# Patient Record
Sex: FEMALE | Race: WHITE | NOT HISPANIC OR LATINO | Employment: STUDENT | URBAN - METROPOLITAN AREA
[De-identification: names, ages, dates, MRNs, and addresses within clinical notes are randomized per-mention and may not be internally consistent; named-entity substitution may affect disease eponyms.]

---

## 2023-07-28 ENCOUNTER — OFFICE VISIT (OUTPATIENT)
Dept: OBSTETRICS AND GYNECOLOGY | Facility: CLINIC | Age: 33
End: 2023-07-28
Attending: STUDENT IN AN ORGANIZED HEALTH CARE EDUCATION/TRAINING PROGRAM
Payer: COMMERCIAL

## 2023-07-28 VITALS
WEIGHT: 257.81 LBS | BODY MASS INDEX: 44.01 KG/M2 | DIASTOLIC BLOOD PRESSURE: 76 MMHG | HEIGHT: 64 IN | SYSTOLIC BLOOD PRESSURE: 120 MMHG

## 2023-07-28 DIAGNOSIS — Z11.51 ENCOUNTER FOR SCREENING FOR HUMAN PAPILLOMAVIRUS (HPV): Primary | ICD-10-CM

## 2023-07-28 DIAGNOSIS — Z12.4 SCREENING FOR CERVICAL CANCER: ICD-10-CM

## 2023-07-28 DIAGNOSIS — Z01.419 WELL WOMAN EXAM: ICD-10-CM

## 2023-07-28 PROCEDURE — 99385 PR PREVENTIVE VISIT,NEW,18-39: ICD-10-PCS | Mod: S$GLB,,, | Performed by: STUDENT IN AN ORGANIZED HEALTH CARE EDUCATION/TRAINING PROGRAM

## 2023-07-28 PROCEDURE — 3078F PR MOST RECENT DIASTOLIC BLOOD PRESSURE < 80 MM HG: ICD-10-PCS | Mod: CPTII,S$GLB,, | Performed by: STUDENT IN AN ORGANIZED HEALTH CARE EDUCATION/TRAINING PROGRAM

## 2023-07-28 PROCEDURE — 3008F BODY MASS INDEX DOCD: CPT | Mod: CPTII,S$GLB,, | Performed by: STUDENT IN AN ORGANIZED HEALTH CARE EDUCATION/TRAINING PROGRAM

## 2023-07-28 PROCEDURE — 99999 PR PBB SHADOW E&M-NEW PATIENT-LVL III: ICD-10-PCS | Mod: PBBFAC,,, | Performed by: STUDENT IN AN ORGANIZED HEALTH CARE EDUCATION/TRAINING PROGRAM

## 2023-07-28 PROCEDURE — 99999 PR PBB SHADOW E&M-NEW PATIENT-LVL III: CPT | Mod: PBBFAC,,, | Performed by: STUDENT IN AN ORGANIZED HEALTH CARE EDUCATION/TRAINING PROGRAM

## 2023-07-28 PROCEDURE — 1159F PR MEDICATION LIST DOCUMENTED IN MEDICAL RECORD: ICD-10-PCS | Mod: CPTII,S$GLB,, | Performed by: STUDENT IN AN ORGANIZED HEALTH CARE EDUCATION/TRAINING PROGRAM

## 2023-07-28 PROCEDURE — 1159F MED LIST DOCD IN RCRD: CPT | Mod: CPTII,S$GLB,, | Performed by: STUDENT IN AN ORGANIZED HEALTH CARE EDUCATION/TRAINING PROGRAM

## 2023-07-28 PROCEDURE — 3074F PR MOST RECENT SYSTOLIC BLOOD PRESSURE < 130 MM HG: ICD-10-PCS | Mod: CPTII,S$GLB,, | Performed by: STUDENT IN AN ORGANIZED HEALTH CARE EDUCATION/TRAINING PROGRAM

## 2023-07-28 PROCEDURE — 3008F PR BODY MASS INDEX (BMI) DOCUMENTED: ICD-10-PCS | Mod: CPTII,S$GLB,, | Performed by: STUDENT IN AN ORGANIZED HEALTH CARE EDUCATION/TRAINING PROGRAM

## 2023-07-28 PROCEDURE — 99385 PREV VISIT NEW AGE 18-39: CPT | Mod: S$GLB,,, | Performed by: STUDENT IN AN ORGANIZED HEALTH CARE EDUCATION/TRAINING PROGRAM

## 2023-07-28 PROCEDURE — 87624 HPV HI-RISK TYP POOLED RSLT: CPT | Performed by: STUDENT IN AN ORGANIZED HEALTH CARE EDUCATION/TRAINING PROGRAM

## 2023-07-28 PROCEDURE — 88175 CYTOPATH C/V AUTO FLUID REDO: CPT | Performed by: STUDENT IN AN ORGANIZED HEALTH CARE EDUCATION/TRAINING PROGRAM

## 2023-07-28 PROCEDURE — 3074F SYST BP LT 130 MM HG: CPT | Mod: CPTII,S$GLB,, | Performed by: STUDENT IN AN ORGANIZED HEALTH CARE EDUCATION/TRAINING PROGRAM

## 2023-07-28 PROCEDURE — 3078F DIAST BP <80 MM HG: CPT | Mod: CPTII,S$GLB,, | Performed by: STUDENT IN AN ORGANIZED HEALTH CARE EDUCATION/TRAINING PROGRAM

## 2023-07-28 RX ORDER — DROSPIRENONE AND ETHINYL ESTRADIOL 0.02-3(28)
1 KIT ORAL DAILY
Qty: 30 TABLET | Refills: 11 | Status: SHIPPED | OUTPATIENT
Start: 2023-07-28 | End: 2024-02-12 | Stop reason: ALTCHOICE

## 2023-07-28 RX ORDER — MINOXIDIL 2.5 MG/1
2.5 TABLET ORAL
COMMUNITY
Start: 2023-07-18

## 2023-07-28 RX ORDER — HYDROXYZINE HYDROCHLORIDE 25 MG/1
25 TABLET, FILM COATED ORAL 2 TIMES DAILY
COMMUNITY
Start: 2023-07-17 | End: 2024-02-12

## 2023-07-28 RX ORDER — TRAZODONE HYDROCHLORIDE 50 MG/1
75 TABLET ORAL NIGHTLY PRN
COMMUNITY
Start: 2023-07-17

## 2023-07-28 RX ORDER — ESCITALOPRAM OXALATE 20 MG/1
20 TABLET ORAL
COMMUNITY
Start: 2023-03-22

## 2023-07-28 RX ORDER — BUSPIRONE HYDROCHLORIDE 15 MG/1
15 TABLET ORAL 2 TIMES DAILY
COMMUNITY
Start: 2023-07-17 | End: 2024-02-12

## 2023-07-28 RX ORDER — DROSPIRENONE AND ETHINYL ESTRADIOL 0.03MG-3MG
1 KIT ORAL DAILY
Qty: 30 TABLET | Refills: 11 | Status: CANCELLED | OUTPATIENT
Start: 2023-07-28 | End: 2024-07-27

## 2023-07-28 RX ORDER — METFORMIN HYDROCHLORIDE 500 MG/1
500 TABLET ORAL 2 TIMES DAILY
COMMUNITY
Start: 2023-05-31

## 2023-07-28 RX ORDER — SPIRONOLACTONE 100 MG/1
200 TABLET, FILM COATED ORAL
COMMUNITY
Start: 2023-07-17

## 2023-07-28 NOTE — PROGRESS NOTES
Chief Complaint: Well Woman Exam     HPI:      Roxanna Banuelos is a 32 y.o.  who presents today for well woman exam.  LMP: Patient's last menstrual period was 2023.  Has several concerns.  Hypothyroid and also PCOS.  Did well on compa previously but would have cycle very 1.5 months.  Stopped taking it in May and had bleeding on , , and .  Wants to discuss other options.  No other issues, problems, or complaints. Specifically, patient denies abnormal vaginal bleeding, discharge, pelvic pain, urinary problems, or changes in appetite. Ms. Banuelos is not currently sexually active.     Previous Pap: 2023   OB History          0    Para   0    Term   0       0    AB   0    Living   0         SAB   0    IAB   0    Ectopic   0    Multiple   0    Live Births   0               Past Medical History:   Diagnosis Date    Hypothyroid     Oligomenorrhea 01/15/2013     Past Surgical History:   Procedure Laterality Date    REDUCTION OF BOTH BREASTS      SCALP LESION REMOVAL W/ FLAP AND SKIN GRAFT      TONSILLECTOMY, ADENOIDECTOMY       Social History     Socioeconomic History    Marital status: Single   Tobacco Use    Smoking status: Never   Substance and Sexual Activity    Alcohol use: Never     Comment: social    Drug use: Never    Sexual activity: Never   Social History Narrative    Senior at [employer].     Family History   Problem Relation Age of Onset    Breast cancer Paternal Grandmother     Breast cancer Maternal Grandmother     Colon cancer Neg Hx     Ovarian cancer Neg Hx     Diabetes Neg Hx        Current Outpatient Medications:     busPIRone (BUSPAR) 15 MG tablet, Take 15 mg by mouth 2 (two) times daily., Disp: , Rfl:     EScitalopram oxalate (LEXAPRO) 20 MG tablet, Take 20 mg by mouth., Disp: , Rfl:     hydrOXYzine HCL (ATARAX) 25 MG tablet, Take 25 mg by mouth 2 (two) times daily., Disp: , Rfl:     metFORMIN (GLUCOPHAGE) 500 MG tablet, Take 500 mg by mouth 2 (two) times daily., Disp:  ", Rfl:     minoxidiL (LONITEN) 2.5 MG tablet, Take 2.5 mg by mouth., Disp: , Rfl:     multivitamin capsule, Take 1 capsule by mouth once daily., Disp: , Rfl:     spironolactone (ALDACTONE) 100 MG tablet, Take 200 mg by mouth., Disp: , Rfl:     TIROSINT 50 mcg Cap, TAKE ONE Capsule BY MOUTH EVERY DAY, Disp: 90 capsule, Rfl: 0    traZODone (DESYREL) 50 MG tablet, Take 75 mg by mouth nightly as needed., Disp: , Rfl:     drospirenone-ethinyl estradioL (MEAGHAN) 3-0.02 mg per tablet, Take 1 tablet by mouth once daily., Disp: 30 tablet, Rfl: 11    ROS:     GENERAL: Denies unintentional weight gain or weight loss. Feeling well overall.   SKIN: Denies rash or lesions.   HEENT: Denies headaches, or vision changes.   CARDIOVASCULAR: Denies palpitations or chest pain.   RESPIRATORY: Denies shortness of breath or dyspnea on exertion.  BREASTS: Denies pain, lumps, or nipple discharge.   ABDOMEN: Denies abdominal pain, constipation, diarrhea, nausea, vomiting, change in appetite.  URINARY: Denies frequency, dysuria, hematuria.  NEUROLOGIC: Denies syncope or weakness.   PSYCHIATRIC: Denies depression, anxiety or mood swings.    Physical Exam:      PHYSICAL EXAM:  /76   Ht 5' 4" (1.626 m)   Wt 117 kg (257 lb 13.3 oz)   LMP 07/05/2023   BMI 44.26 kg/m²   Body mass index is 44.26 kg/m².     APPEARANCE: Well nourished, well developed, in no acute distress.  PSYCH: Appropriate mood and affect.  SKIN: No acne or hirsutism.  NECK: Neck symmetric without masses or thyromegaly.  NODES: No inguinal, axillary, or supraclavicular lymph node enlargement.  BREASTS: Symmetrical, no skin changes or visible lesions.  No palpable masses or nipple discharge bilaterally.  ABDOMEN: Soft.  No tenderness or masses.    PELVIC: Normal external genitalia without lesions.  Normal hair distribution.  Adequate perineal body, normal urethral meatus.  Vagina moist and well rugated without lesions or discharge.  Cervix pink, without lesions, discharge or " tenderness.  No significant cystocele or rectocele.  Bimanual exam shows uterus to be normal size, regular, mobile and nontender.  Adnexa without masses or tenderness.    EXTREMITIES: No edema.  No tenderness to palpation.    Assessment/Plan:     32 y.o.     Encounter for screening for human papillomavirus (HPV)  -     HPV High Risk Genotypes, PCR    Screening for cervical cancer  -     Liquid-Based Pap Smear, Screening    Well woman exam    Other orders  -     drospirenone-ethinyl estradioL (MEAGHAN) 3-0.02 mg per tablet; Take 1 tablet by mouth once daily.  Dispense: 30 tablet; Refill: 11          Counselin.  Annual exam performed today without difficulty.  Patient was counseled today on current ASCCP pap guidelines, the recommendation for yearly pelvic exams, healthy diet and exercise routines, breast self awareness.  Pap smear collected.  All questions answered.    2.  Contraception:  Desires to continue ocps.  Reviewed pcos diagnosis and treatment options.  Will try meaghan.  Discussed increasing dose back to compa if she has break through bleeding.  She is in agreement with plan.  R/B/A reviewed including but not limited to risk of cramping, irregular bleeding, nausea, bloating, breast tenderness, headache, stroke, blood clot, heart attack.  Patient voiced understanding and desires to proceed with treatment.  3.  Follow up with PCP for other health maintenance.  4.  Follow up in about 1 year (around 2024).      Use of the [facility] Patient Portal discussed and encouraged during today's visit.

## 2023-08-02 LAB
HPV HR 12 DNA SPEC QL NAA+PROBE: NEGATIVE
HPV16 AG SPEC QL: NEGATIVE
HPV18 DNA SPEC QL NAA+PROBE: NEGATIVE

## 2023-08-03 LAB
FINAL PATHOLOGIC DIAGNOSIS: NORMAL
Lab: NORMAL

## 2023-09-01 ENCOUNTER — TELEPHONE (OUTPATIENT)
Dept: OBSTETRICS AND GYNECOLOGY | Facility: CLINIC | Age: 33
End: 2023-09-01
Payer: COMMERCIAL

## 2023-09-01 DIAGNOSIS — Z12.31 ENCOUNTER FOR SCREENING MAMMOGRAM FOR BREAST CANCER: Primary | ICD-10-CM

## 2023-09-08 ENCOUNTER — APPOINTMENT (OUTPATIENT)
Dept: RADIOLOGY | Facility: OTHER | Age: 33
End: 2023-09-08
Attending: STUDENT IN AN ORGANIZED HEALTH CARE EDUCATION/TRAINING PROGRAM
Payer: COMMERCIAL

## 2023-09-08 VITALS — HEIGHT: 64 IN | WEIGHT: 257.94 LBS | BODY MASS INDEX: 44.04 KG/M2

## 2023-09-08 DIAGNOSIS — Z12.31 ENCOUNTER FOR SCREENING MAMMOGRAM FOR BREAST CANCER: ICD-10-CM

## 2023-09-08 PROCEDURE — 77063 MAMMO DIGITAL SCREENING BILAT WITH TOMO: ICD-10-PCS | Mod: 26,,, | Performed by: RADIOLOGY

## 2023-09-08 PROCEDURE — 77067 MAMMO DIGITAL SCREENING BILAT WITH TOMO: ICD-10-PCS | Mod: 26,,, | Performed by: RADIOLOGY

## 2023-09-08 PROCEDURE — 77063 BREAST TOMOSYNTHESIS BI: CPT | Mod: 26,,, | Performed by: RADIOLOGY

## 2023-09-08 PROCEDURE — 77067 SCR MAMMO BI INCL CAD: CPT | Mod: TC,PN

## 2023-09-08 PROCEDURE — 77067 SCR MAMMO BI INCL CAD: CPT | Mod: 26,,, | Performed by: RADIOLOGY

## 2023-09-12 ENCOUNTER — TELEPHONE (OUTPATIENT)
Dept: OBSTETRICS AND GYNECOLOGY | Facility: CLINIC | Age: 33
End: 2023-09-12
Payer: COMMERCIAL

## 2023-09-12 DIAGNOSIS — Z91.89 AT HIGH RISK FOR BREAST CANCER: Primary | ICD-10-CM

## 2023-09-12 NOTE — TELEPHONE ENCOUNTER
Spoke with patient and all questions answered.  Will set up for breast clinic.  She is in agreement with plan.

## 2023-09-29 ENCOUNTER — OFFICE VISIT (OUTPATIENT)
Dept: HEMATOLOGY/ONCOLOGY | Facility: CLINIC | Age: 33
End: 2023-09-29
Attending: STUDENT IN AN ORGANIZED HEALTH CARE EDUCATION/TRAINING PROGRAM
Payer: COMMERCIAL

## 2023-09-29 ENCOUNTER — PATIENT MESSAGE (OUTPATIENT)
Dept: HEMATOLOGY/ONCOLOGY | Facility: CLINIC | Age: 33
End: 2023-09-29
Payer: COMMERCIAL

## 2023-09-29 VITALS
BODY MASS INDEX: 41.85 KG/M2 | HEIGHT: 64 IN | RESPIRATION RATE: 18 BRPM | SYSTOLIC BLOOD PRESSURE: 128 MMHG | WEIGHT: 245.13 LBS | TEMPERATURE: 98 F | OXYGEN SATURATION: 98 % | DIASTOLIC BLOOD PRESSURE: 79 MMHG | HEART RATE: 73 BPM

## 2023-09-29 DIAGNOSIS — Z91.89 AT HIGH RISK FOR BREAST CANCER: Primary | ICD-10-CM

## 2023-09-29 DIAGNOSIS — Z80.3 FAMILY HISTORY OF BREAST CANCER: ICD-10-CM

## 2023-09-29 PROCEDURE — 1159F PR MEDICATION LIST DOCUMENTED IN MEDICAL RECORD: ICD-10-PCS | Mod: CPTII,S$GLB,, | Performed by: NURSE PRACTITIONER

## 2023-09-29 PROCEDURE — 3078F PR MOST RECENT DIASTOLIC BLOOD PRESSURE < 80 MM HG: ICD-10-PCS | Mod: CPTII,S$GLB,, | Performed by: NURSE PRACTITIONER

## 2023-09-29 PROCEDURE — 3008F BODY MASS INDEX DOCD: CPT | Mod: CPTII,S$GLB,, | Performed by: NURSE PRACTITIONER

## 2023-09-29 PROCEDURE — 99999 PR PBB SHADOW E&M-EST. PATIENT-LVL V: ICD-10-PCS | Mod: PBBFAC,,, | Performed by: NURSE PRACTITIONER

## 2023-09-29 PROCEDURE — 99204 PR OFFICE/OUTPT VISIT, NEW, LEVL IV, 45-59 MIN: ICD-10-PCS | Mod: S$GLB,,, | Performed by: NURSE PRACTITIONER

## 2023-09-29 PROCEDURE — 99204 OFFICE O/P NEW MOD 45 MIN: CPT | Mod: S$GLB,,, | Performed by: NURSE PRACTITIONER

## 2023-09-29 PROCEDURE — 3008F PR BODY MASS INDEX (BMI) DOCUMENTED: ICD-10-PCS | Mod: CPTII,S$GLB,, | Performed by: NURSE PRACTITIONER

## 2023-09-29 PROCEDURE — 99999 PR PBB SHADOW E&M-EST. PATIENT-LVL V: CPT | Mod: PBBFAC,,, | Performed by: NURSE PRACTITIONER

## 2023-09-29 PROCEDURE — 3074F PR MOST RECENT SYSTOLIC BLOOD PRESSURE < 130 MM HG: ICD-10-PCS | Mod: CPTII,S$GLB,, | Performed by: NURSE PRACTITIONER

## 2023-09-29 PROCEDURE — 3074F SYST BP LT 130 MM HG: CPT | Mod: CPTII,S$GLB,, | Performed by: NURSE PRACTITIONER

## 2023-09-29 PROCEDURE — 1159F MED LIST DOCD IN RCRD: CPT | Mod: CPTII,S$GLB,, | Performed by: NURSE PRACTITIONER

## 2023-09-29 PROCEDURE — 3078F DIAST BP <80 MM HG: CPT | Mod: CPTII,S$GLB,, | Performed by: NURSE PRACTITIONER

## 2023-09-29 NOTE — PROGRESS NOTES
"  Reason For Consultation:   Increased lifetime risk of breast cancer    Referring Provider:   Jessica Huddleston MD  [address]  [address]  [place] [address]    Records Obtained: Records of the patients history including those obtained from the referring provider were reviewed and summarized in detail.    HPI:   Roxanna Banuelos presents for consultation of increased risk of breast cancer. She is premenopausal. She presented for screening mammogram on 23 which was benign but revealed a Tyrer-Cuzick score of 23.95%.     Today, Feels good and no complaints.   No breast concerns.    High Risk Breast cancer specific history:  - Age: 32 y.o.   - Height:  5'4"  - Weight: 245 lbs  - Breast density per BI-RADS: almost entirely fatty   - Age at menarche:  11  - Number of pregnancies: ; age of first live birth: n/a   - History of breast feeding: No.   - Age at menopause, if applicable:  n/a.  -Uterus and ovaries intact: Yes  - HRT: No, does take oral contraceptives      - Genetic testing: No  - Personal history of cancer: No  - Previous chest radiation exposure between ages 10-30 years old: No  - Personal history of breast biopsy: No  - [employer]ken[employer] Inheritance: No  - Family history of cancer:  paternal grandmother: breast cancer at 48,  at 52, both breasts  Maternal grandmother: breast cancer at 83, now 84, one breast, brca negative  Maternal grandmother's sister: breast cancer  Maternal grandmother's sister's grandchild: breast cancer  Dad: skin cancer  Mom: has breast cysts    Social History:  Tobacco use:  none  Alcohol use:  social alcohol use  Exercise regimen: 30 minutes 3 times per week  Employment:       SEE CALCULATED RISK BELOW.     Past Medical   Past Medical History:   Diagnosis Date    Hypothyroid     Oligomenorrhea 01/15/2013     Patient Active Problem List   Diagnosis    Hirsutism     Social History   Social History     Tobacco Use    Smoking status: Never "   Substance Use Topics    Alcohol use: Never     Comment: social    Drug use: Never     Family History  Family History   Problem Relation Age of Onset    Breast cancer Maternal Grandmother         BRCA negative    Breast cancer Paternal Grandmother     Colon cancer Neg Hx     Ovarian cancer Neg Hx     Diabetes Neg Hx      Medications    Current Outpatient Medications:     busPIRone (BUSPAR) 15 MG tablet, Take 15 mg by mouth 2 (two) times daily., Disp: , Rfl:     drospirenone-ethinyl estradioL (MEAGHAN) 3-0.02 mg per tablet, Take 1 tablet by mouth once daily., Disp: 30 tablet, Rfl: 11    EScitalopram oxalate (LEXAPRO) 20 MG tablet, Take 20 mg by mouth., Disp: , Rfl:     hydrOXYzine HCL (ATARAX) 25 MG tablet, Take 25 mg by mouth 2 (two) times daily., Disp: , Rfl:     metFORMIN (GLUCOPHAGE) 500 MG tablet, Take 500 mg by mouth 2 (two) times daily., Disp: , Rfl:     minoxidiL (LONITEN) 2.5 MG tablet, Take 2.5 mg by mouth., Disp: , Rfl:     multivitamin capsule, Take 1 capsule by mouth once daily., Disp: , Rfl:     spironolactone (ALDACTONE) 100 MG tablet, Take 200 mg by mouth., Disp: , Rfl:     TIROSINT 50 mcg Cap, TAKE ONE Capsule BY MOUTH EVERY DAY, Disp: 90 capsule, Rfl: 0    traZODone (DESYREL) 50 MG tablet, Take 75 mg by mouth nightly as needed., Disp: , Rfl:   Allergies  Review of patient's allergies indicates:   Allergen Reactions    Cucumber (cucumis sativus) Hives    Horse dander     Watermelon Hives       Review of Systems       See above   Review of Systems  Review of Systems   Constitutional:  Negative for appetite change and unexpected weight change.   HENT:  Negative for mouth sores.    Eyes:  Negative for visual disturbance.   Respiratory:  Negative for cough and shortness of breath.    Cardiovascular:  Negative for chest pain.   Gastrointestinal:  Negative for abdominal pain and diarrhea.   Genitourinary:  Negative for frequency.   Musculoskeletal:  Negative for back pain.   Skin:  Negative for rash.  "  Neurological:  Negative for headaches.   Hematological:  Negative for adenopathy.   Psychiatric/Behavioral:  The patient is not nervous/anxious.      All other systems reviewed and are negative.    Objective:      Vitals:   Vitals:    09/29/23 1045   BP: 128/79   Pulse: 73   Resp: 18   Temp: 98.2 °F (36.8 °C)   TempSrc: Oral   SpO2: 98%   Weight: 111.2 kg (245 lb 2.4 oz)   Height: 5' 4" (1.626 m)     BMI: Body mass index is 42.08 kg/m².   Body surface area is 2.24 meters squared.    Physical Exam  Constitutional:       General: She is not in acute distress.     Appearance: She is well-developed. She is not diaphoretic.   HENT:      Head: Normocephalic and atraumatic.   Eyes:      General: No scleral icterus.     Conjunctiva/sclera: Conjunctivae normal.   Cardiovascular:      Rate and Rhythm: Normal rate and regular rhythm.      Pulses: Normal pulses.      Heart sounds: Normal heart sounds.   Pulmonary:      Effort: Pulmonary effort is normal. No respiratory distress.      Breath sounds: Normal breath sounds.   Chest:      Comments: No skin changes or breast masses noted bilaterally, no  lymphadenopathy noted, nipples flat since breast reduction in 2018    Abdominal:      General: There is no distension.   Musculoskeletal:         General: Normal range of motion.      Cervical back: Normal range of motion and neck supple.   Skin:     General: Skin is warm and dry.   Neurological:      Mental Status: She is alert and oriented to person, place, and time.   Psychiatric:         Behavior: Behavior normal.       Laboratory Data: reviewed most recent   Imaging: reviewed most recent    Assessment:     1. At high risk for breast cancer    2. Family history of breast cancer        1. Increased risk of breast cancer   * Tyrer-Cuzick (TC) lifetime risk of 23.5%. We discussed that TC score will categorize your lifetime risk of being diagnosed with breast cancer. Categories are as such: Average risk <15%, Intermediate risk " 15-19%, and High risk > or = to 20%.    *The Gilda Model for Breast Cancer risk: not able to calculate today given age  Recommendation for women with elevated TC score:     Recommendation for women with elevated Gilda Model.         Risk factors are categorized into 2 groups: Modifiable and Non-modifiable. Modifiable risk factors include use of hormones, alcohol, smoking, diet and exercise. Non-modifiable risk factors include breast density, genetics, chest radiation, previous pregnancies, age of first period, and age of menopause.      * For women at high risk for breast cancer, endocrine therapy can reduce the risk of invasive and/or in situ breast cancers. (tamoxifen for premenopausal or postmenopausal women and raloxifene or exemestane for postmenopausal women).     * Discussed that Tamoxifen 20 mg daily for 5 years has shown to reduce risk of breast cancer by 49% and women with ADH/ALH or LCIS have an even more significant reduction of risk of 86%. Aromatase inhibitors for 5 years have also shown risk reduction in terms of 50-60%. Newer studies show that Tamoxifen 5 mg for three years shows comparable risk reduction.  At current, there is not adequate data to recommend longer courses of therapy more than 5 years for risk reduction.      * Tamoxifen has limited data in BRCA 1/2 mutation carriers but limited retrospective data is suggestive of benefit. There is retrospective data that aromatase inhibitors can reduce the risk of contralateral ER positive breast cancers in BRCA 1/2 patients who were taking AIs as adjuvant therapy. There is no data for raloxifen in the population.      * Reviewed risks of Tamoxifen side effects include hot flashes, invasive endometrial cancer in women > 49 years of age (2.3/1000 compared to 0.9/1000), cataracts, increased risk of pulmonary embolism among others.     * Reviewed Lifestyle modifications which have shown benefit:  Limit alcohol consumption to less than 1 drink per day (1  ounce liquor, 6 oz wine, 8 oz beer)  Avoid smoking.  Exercise at least 150 minutes per week of moderate intensity aerobic activity or at least 75 minutes of vigorous activity. Exercise can lower the relative risk of breast cancer by ~18-20%.  Maintain healthy weight and avoid post-menopausal weight gain. Avoid processed foods and eat more lean proteins, fruits and vegetables.                  * Discussed available resources including genetic counseling, nutrition, weight management.      * Reviewed future screening:   Semiannual (every 6 months) CBE (clinical breast exam).   Annual Breast MRI alternating with an annual MMG. if TC 20% or greater.     FDA  link about Gadolinium exposure:      https://www.fda.gov/drugs/drug-safety-and-availability/fda-drug-safety-communication-fda-identifies-no-harmful-effects-date-brain-retention-gadolinium     https://www.fda.gov/Drugs/DrugSafety/qfl052792.htm               Discussed with patient that there are several models available for stratifying breast cancer risk, and Leonardo-Zaida is presently the model utilized by [facility] and is a model recommended per current NCCN guidelines.    The Gilda Model for Breast Cancer risk estimates the absolute 5 year risk and lifetime risk of developing breast cancer. Family history includes only first degree relatives with breast cancer, which is not enough information to estimate the risk of a patient having BRCA mutation. It also underestimates the cancer risk for patients with extensive family history. The Gilda Model is a good predictor of risk for populations but not for individuals. It adjusts risk for race/ethnicity. It may underestimate breast cancer risk in patients with atypical hyperplasia and strong family history. The Gilda Model was NOT designed to estimate risk for: Women with a prior diagnosis of breast cancer, lobular carcinoma in situ (LCIS), or ductal carcinoma in situ (DCIS);  Women who have received previous  radiation therapy to the chest for treatment of Hodgkin lymphoma;  Women with gene mutations in BRCA1 or BRCA2, or those who are known to have certain genetic syndromes that increase risk for breast cancer; Women of age <35 or >85.              Plan:     Chemoprevention not indicated at this time  Patient elects to proceed with annual mammograms starting at age 35, will consider adding in a yearly MRI at age 38, youngest family member at time of diagnosis was 48  Patient will follow up with PCP or GYN for one semiannual CBE  in July 2024 and will RTC here for one semiannual CBE in January/February 2024  Lifestyle modifications as detailed above.   5.   Encouraged breast awareness, including monthly breast self-exams.   6.   Refer to Genetic counseling.     RTC in 4 months to see me either at [facility] or on 3rd floor..     Questions were encouraged and answered to patient's satisfaction, and patient verbalized understanding of information and agreement with the plan. Advised patient to RTC with any interval changes or concerns.      Patient is in agreement with the proposed treatment plan. All questions were answered to the patient's satisfaction. Patient knows to call clinic for any new or worsening symptoms and if anything is needed before the next clinic visit.    DELLA Chacon      Med Onc Chart Routing      Follow up with physician    Follow up with OSVALDO . 4 months, Jan/Feb 2024   Infusion scheduling note    Injection scheduling note    Labs    Imaging    Pharmacy appointment    Other referrals                DELLA Chacon  Hematology & Medical Oncology   [facility] [place] [address]  ph. [phone]  Fax. [phone]    Total time spent with the patient: 45 minutes, 35 minutes of face to face consultation and 10 minutes of chart review and coordination of care, on the day of the visit. This includes face to face time and non-face to face time preparing to see the patient (eg,  review of tests), obtaining and/or reviewing separately obtained history, documenting clinical information in the electronic or other health record, independently interpreting resultsand communicating results to the patient/family/caregiver, or care coordination.

## 2024-02-12 ENCOUNTER — PATIENT MESSAGE (OUTPATIENT)
Dept: FAMILY MEDICINE | Facility: CLINIC | Age: 34
End: 2024-02-12

## 2024-02-12 ENCOUNTER — OFFICE VISIT (OUTPATIENT)
Dept: FAMILY MEDICINE | Facility: CLINIC | Age: 34
End: 2024-02-12
Payer: COMMERCIAL

## 2024-02-12 VITALS
HEART RATE: 101 BPM | HEIGHT: 64 IN | OXYGEN SATURATION: 98 % | BODY MASS INDEX: 44.97 KG/M2 | DIASTOLIC BLOOD PRESSURE: 84 MMHG | TEMPERATURE: 98 F | SYSTOLIC BLOOD PRESSURE: 128 MMHG | WEIGHT: 263.44 LBS

## 2024-02-12 DIAGNOSIS — E88.819 INSULIN RESISTANCE: ICD-10-CM

## 2024-02-12 DIAGNOSIS — E66.01 MORBID OBESITY: ICD-10-CM

## 2024-02-12 DIAGNOSIS — Z00.00 ANNUAL PHYSICAL EXAM: Primary | ICD-10-CM

## 2024-02-12 DIAGNOSIS — L68.0 HIRSUTISM: ICD-10-CM

## 2024-02-12 PROBLEM — N92.6 IRREGULAR MENSTRUAL CYCLE: Status: ACTIVE | Noted: 2023-02-15

## 2024-02-12 PROBLEM — E28.2 PCOS (POLYCYSTIC OVARIAN SYNDROME): Status: ACTIVE | Noted: 2021-10-12

## 2024-02-12 PROBLEM — R63.5 ABNORMAL WEIGHT GAIN: Status: ACTIVE | Noted: 2023-02-15

## 2024-02-12 PROBLEM — E78.5 DYSLIPIDEMIA: Status: ACTIVE | Noted: 2022-04-13

## 2024-02-12 PROBLEM — F41.1 GENERALIZED ANXIETY DISORDER: Status: ACTIVE | Noted: 2021-04-12

## 2024-02-12 PROBLEM — E03.9 PRIMARY HYPOTHYROIDISM: Status: ACTIVE | Noted: 2021-04-12

## 2024-02-12 PROCEDURE — 1160F RVW MEDS BY RX/DR IN RCRD: CPT | Mod: CPTII,S$GLB,, | Performed by: FAMILY MEDICINE

## 2024-02-12 PROCEDURE — 3079F DIAST BP 80-89 MM HG: CPT | Mod: CPTII,S$GLB,, | Performed by: FAMILY MEDICINE

## 2024-02-12 PROCEDURE — 3008F BODY MASS INDEX DOCD: CPT | Mod: CPTII,S$GLB,, | Performed by: FAMILY MEDICINE

## 2024-02-12 PROCEDURE — 99395 PREV VISIT EST AGE 18-39: CPT | Mod: S$GLB,,, | Performed by: FAMILY MEDICINE

## 2024-02-12 PROCEDURE — 1159F MED LIST DOCD IN RCRD: CPT | Mod: CPTII,S$GLB,, | Performed by: FAMILY MEDICINE

## 2024-02-12 PROCEDURE — 3074F SYST BP LT 130 MM HG: CPT | Mod: CPTII,S$GLB,, | Performed by: FAMILY MEDICINE

## 2024-02-12 PROCEDURE — 99999 PR PBB SHADOW E&M-EST. PATIENT-LVL IV: CPT | Mod: PBBFAC,,, | Performed by: FAMILY MEDICINE

## 2024-02-12 RX ORDER — TIRZEPATIDE 2.5 MG/.5ML
2.5 INJECTION, SOLUTION SUBCUTANEOUS
Qty: 4 PEN | Refills: 11 | Status: SHIPPED | OUTPATIENT
Start: 2024-02-12

## 2024-02-12 NOTE — PROGRESS NOTES
"Chief Complaint   Patient presents with    [facility] Care       SUBJECTIVE:   33 y.o. female for annual routine checkup.  ECA  Current Outpatient Medications   Medication Sig Dispense Refill    EScitalopram oxalate (LEXAPRO) 20 MG tablet Take 20 mg by mouth.      minoxidiL (LONITEN) 2.5 MG tablet Take 2.5 mg by mouth.      multivitamin capsule Take 1 capsule by mouth once daily.      spironolactone (ALDACTONE) 100 MG tablet Take 200 mg by mouth.      TIROSINT 50 mcg Cap TAKE ONE Capsule BY MOUTH EVERY DAY 90 capsule 0    metFORMIN (GLUCOPHAGE) 500 MG tablet Take 500 mg by mouth 2 (two) times daily.      tirzepatide (MOUNJARO) 2.5 mg/0.5 mL PnIj Inject 2.5 mg into the skin every 7 days. 4 pen 11    traZODone (DESYREL) 50 MG tablet Take 75 mg by mouth nightly as needed.       No current facility-administered medications for this visit.     Allergies: Cucumber (cucumis sativus), Horse dander, and Watermelon   Patient's last menstrual period was 12/28/2023.    ROS:  Feeling well. No dyspnea or chest pain on exertion.  No abdominal pain, change in bowel habits, black or bloody stools.  No urinary tract symptoms. GYN ROS: has cortisol issues and stress, weight gain. No neurological complaints.    OBJECTIVE:   The patient appears well, alert, oriented x 3, in no distress.  /84   Pulse 101   Temp 98.3 °F (36.8 °C) (Oral)   Ht 5' 4" (1.626 m)   Wt 119.5 kg (263 lb 7.2 oz)   LMP 12/28/2023   SpO2 98%   BMI 45.22 kg/m²   Wt Readings from Last 5 Encounters:   02/12/24 119.5 kg (263 lb 7.2 oz)   09/29/23 111.2 kg (245 lb 2.4 oz)   09/08/23 117 kg (257 lb 15 oz)   07/28/23 117 kg (257 lb 13.3 oz)   08/30/18 86.9 kg (191 lb 8 oz)     Increased hirsute  Insulin resistance signs  Central obesity noted    BREAST EXAM: deferred    PELVIC EXAM: deferred    ASSESSMENT:   1. Annual physical exam    2. Hirsutism    3. Morbid obesity    4. Insulin resistance          PLAN:   Counseled on age appropriate medical preventative " services, including age appropriate cancer screenings, over all nutritional health, need for a consistent exercise regimen and an over all push towards maintaining a vigorous and active lifestyle.  Counseled on age appropriate vaccines and discussed upcoming health care needs based on age/gender.  Spent time with patient counseling on need for a good patient/doctor relationship moving forward.  Discussed use of common OTC medications and supplements.  Discussed common dietary aids and use of caffeine and the need for good sleep hygiene and stress management.    Problem List Items Addressed This Visit       Hirsutism    Current Assessment & Plan     Needs glp1RA         Relevant Medications    tirzepatide (MOUNJARO) 2.5 mg/0.5 mL PnIj    Morbid obesity    Overview     Has had success  265 lb for weight loss  150 lb         Current Assessment & Plan     The patient is asked to make an attempt to improve diet and exercise patterns to aid in medical management of this problem.           Relevant Medications    tirzepatide (MOUNJARO) 2.5 mg/0.5 mL PnIj     Other Visit Diagnoses       Annual physical exam    -  Primary    Insulin resistance        Relevant Medications    tirzepatide (MOUNJARO) 2.5 mg/0.5 mL PnIj          Potential medication side effects were discussed with the patient; let me know if any occur.  Get her on a glp1RA  F/u in 1 year for wellness

## 2024-02-12 NOTE — PATIENT INSTRUCTIONS
[phone] [facility] pharmacy, 289 dollars for 6 months depending on dose you need.  I will fax prescription.  Call them before going to  to be sure it is ready.  It is in [place].  [phone]  Start with 0.1 ml weekly for 2 weeks then check in with me on here on how you are doing.  Thyroid cancer and pancreatitis are black box warnings but the risk was in rats for cancer and diabetics for pancreatitis.  Make sure to get a good bowel regimen.  I recommend stool softener daily and stimulant laxative every 2-3 days if no good bowel movement.    Titration Schedule for compound GLP-1 agonists:    12,15,18,22,25,30,35,40 units    2 weeks on each dose at least.   Stay on the same dose as long as it is working to reduce side effects/cost.   Message us once you get to 25 units, let us know if you have any side effects.

## 2024-04-05 ENCOUNTER — TELEPHONE (OUTPATIENT)
Dept: PHARMACY | Facility: CLINIC | Age: 34
End: 2024-04-05
Payer: COMMERCIAL

## 2024-04-06 NOTE — TELEPHONE ENCOUNTER
Contacted the patient to perform Medication Therapy Management review, specifically in reference to refilling metformin to improve PDC for HEDIS measurements.   Waiting for patient response to verify

## 2024-04-09 NOTE — TELEPHONE ENCOUNTER
Closing encounter after initial inquiry to patient to perform Medication Therapy Management to improve PDC for HEDIS measurements.   Med discontinued

## 2024-06-03 ENCOUNTER — TELEPHONE (OUTPATIENT)
Dept: HEMATOLOGY/ONCOLOGY | Facility: CLINIC | Age: 34
End: 2024-06-03
Payer: COMMERCIAL

## 2024-06-03 ENCOUNTER — OFFICE VISIT (OUTPATIENT)
Dept: HEMATOLOGY/ONCOLOGY | Facility: CLINIC | Age: 34
End: 2024-06-03
Payer: COMMERCIAL

## 2024-06-03 ENCOUNTER — PATIENT MESSAGE (OUTPATIENT)
Dept: HEMATOLOGY/ONCOLOGY | Facility: CLINIC | Age: 34
End: 2024-06-03
Payer: COMMERCIAL

## 2024-06-03 VITALS
OXYGEN SATURATION: 98 % | WEIGHT: 259.06 LBS | HEIGHT: 64 IN | DIASTOLIC BLOOD PRESSURE: 78 MMHG | HEART RATE: 86 BPM | SYSTOLIC BLOOD PRESSURE: 132 MMHG | BODY MASS INDEX: 44.23 KG/M2 | TEMPERATURE: 98 F

## 2024-06-03 DIAGNOSIS — Z80.3 FAMILY HISTORY OF BREAST CANCER: ICD-10-CM

## 2024-06-03 DIAGNOSIS — Z12.31 ENCOUNTER FOR SCREENING MAMMOGRAM FOR MALIGNANT NEOPLASM OF BREAST: ICD-10-CM

## 2024-06-03 DIAGNOSIS — Z91.89 AT HIGH RISK FOR BREAST CANCER: Primary | ICD-10-CM

## 2024-06-03 PROCEDURE — 3075F SYST BP GE 130 - 139MM HG: CPT | Mod: CPTII,S$GLB,, | Performed by: NURSE PRACTITIONER

## 2024-06-03 PROCEDURE — 3078F DIAST BP <80 MM HG: CPT | Mod: CPTII,S$GLB,, | Performed by: NURSE PRACTITIONER

## 2024-06-03 PROCEDURE — 99214 OFFICE O/P EST MOD 30 MIN: CPT | Mod: S$GLB,,, | Performed by: NURSE PRACTITIONER

## 2024-06-03 PROCEDURE — 99999 PR PBB SHADOW E&M-EST. PATIENT-LVL III: CPT | Mod: PBBFAC,,, | Performed by: NURSE PRACTITIONER

## 2024-06-03 PROCEDURE — 3008F BODY MASS INDEX DOCD: CPT | Mod: CPTII,S$GLB,, | Performed by: NURSE PRACTITIONER

## 2024-06-03 NOTE — TELEPHONE ENCOUNTER
Called patient and left message to call back to schedule a genetic consult and sent message in the portal. ----- Message from Maggie Villarreal NP sent at 6/3/2024 12:24 PM CDT -----  Can you please call this patient and schedule her for genetics.  Thank you.    Maggie

## 2024-06-03 NOTE — PROGRESS NOTES
"  Reason For Consultation:   Increased lifetime risk of breast cancer    Referring Provider:   No referring provider defined for this encounter.    Records Obtained: Records of the patients history including those obtained from the referring provider were reviewed and summarized in detail.    HPI:   Roxanna Banuelos presents for a follow up for increased risk of breast cancer. She is premenopausal. She presented for screening mammogram on 23 which was benign but revealed a Tyrer-Cuzick score of 23.95%.     Today, Feels good and no complaints.   No breast concerns.  Working on weight loss with pcp    High Risk Breast cancer specific history:  - Age: 33 y.o.   - Height:  5'4"  - Weight: 254 lbs  - Breast density per BI-RADS: almost entirely fatty   - Age at menarche:  11  - Number of pregnancies: ; age of first live birth: n/a   - History of breast feeding: No.   - Age at menopause, if applicable:  n/a.  -Uterus and ovaries intact: Yes  - HRT: No, does take oral contraceptives    - Genetic testing: No  - Personal history of cancer: No  - Previous chest radiation exposure between ages 10-30 years old: No  - Personal history of breast biopsy: No  - Ashkenazi L[employer] day Inheritance: No  - Family history of cancer:  paternal grandmother: breast cancer at 48,  at 52, both breasts  Maternal grandmother: breast cancer at 83, now 84, one breast, brca negative  Maternal grandmother's sister: breast cancer  Maternal grandmother's sister's grandchild: breast cancer  Dad: skin cancer  Mom: has breast cysts    Social History:  Tobacco use:  none  Alcohol use:  social alcohol use  Exercise regimen: 30 minutes 3 times per week  Employment:       SEE CALCULATED RISK BELOW.     Past Medical   Past Medical History:   Diagnosis Date    Hypothyroid     Oligomenorrhea 01/15/2013     Patient Active Problem List   Diagnosis    Hirsutism    Abnormal weight gain    Dyslipidemia    Generalized anxiety disorder    Irregular " menstrual cycle    PCOS (polycystic ovarian syndrome)    Primary hypothyroidism    Morbid obesity     Social History   Social History     Tobacco Use    Smoking status: Never   Substance Use Topics    Alcohol use: Never     Comment: social    Drug use: Never     Family History  Family History   Problem Relation Name Age of Onset    Breast cancer Maternal Grandmother          BRCA negative    Breast cancer Paternal Grandmother      Colon cancer Neg Hx      Ovarian cancer Neg Hx      Diabetes Neg Hx       Medications    Current Outpatient Medications:     EScitalopram oxalate (LEXAPRO) 20 MG tablet, Take 20 mg by mouth., Disp: , Rfl:     minoxidiL (LONITEN) 2.5 MG tablet, Take 2.5 mg by mouth., Disp: , Rfl:     multivitamin capsule, Take 1 capsule by mouth once daily., Disp: , Rfl:     spironolactone (ALDACTONE) 100 MG tablet, Take 200 mg by mouth., Disp: , Rfl:     TIROSINT 50 mcg Cap, TAKE ONE Capsule BY MOUTH EVERY DAY, Disp: 90 capsule, Rfl: 0    tirzepatide (MOUNJARO) 2.5 mg/0.5 mL PnIj, Inject 2.5 mg into the skin every 7 days., Disp: 4 pen , Rfl: 11    traZODone (DESYREL) 50 MG tablet, Take 75 mg by mouth nightly as needed., Disp: , Rfl:     metFORMIN (GLUCOPHAGE) 500 MG tablet, Take 500 mg by mouth 2 (two) times daily., Disp: , Rfl:   Allergies  Review of patient's allergies indicates:   Allergen Reactions    Cucumber (cucumis sativus) Hives    Horse dander     Watermelon Hives       Review of Systems       See above   Review of Systems  Review of Systems   Constitutional:  Negative for appetite change and unexpected weight change.   HENT:  Negative for mouth sores.    Eyes:  Negative for visual disturbance.   Respiratory:  Negative for cough and shortness of breath.    Cardiovascular:  Negative for chest pain.   Gastrointestinal:  Negative for abdominal pain and diarrhea.   Genitourinary:  Negative for frequency.   Musculoskeletal:  Negative for back pain.   Skin:  Negative for rash.   Neurological:  Negative  "for headaches.   Hematological:  Negative for adenopathy.   Psychiatric/Behavioral:  The patient is not nervous/anxious.      All other systems reviewed and are negative.    Objective:      Vitals:   Vitals:    06/03/24 1149   BP: 132/78   BP Location: Left arm   Patient Position: Sitting   BP Method: Medium (Automatic)   Pulse: 86   Temp: 98.2 °F (36.8 °C)   TempSrc: Oral   SpO2: 98%   Weight: 117.5 kg (259 lb 0.7 oz)   Height: 5' 4" (1.626 m)       BMI: Body mass index is 44.46 kg/m².   Body surface area is 2.3 meters squared.    Physical Exam  Constitutional:       General: She is not in acute distress.     Appearance: She is well-developed. She is not diaphoretic.   HENT:      Head: Normocephalic and atraumatic.   Eyes:      General: No scleral icterus.     Conjunctiva/sclera: Conjunctivae normal.   Cardiovascular:      Rate and Rhythm: Normal rate and regular rhythm.      Pulses: Normal pulses.      Heart sounds: Normal heart sounds.   Pulmonary:      Effort: Pulmonary effort is normal. No respiratory distress.      Breath sounds: Normal breath sounds.   Chest:      Comments: No skin changes or breast masses noted bilaterally, no  lymphadenopathy noted, nipples flat since breast reduction in 2018    Abdominal:      General: There is no distension.   Musculoskeletal:         General: Normal range of motion.      Cervical back: Normal range of motion and neck supple.   Skin:     General: Skin is warm and dry.   Neurological:      Mental Status: She is alert and oriented to person, place, and time.   Psychiatric:         Behavior: Behavior normal.       Laboratory Data: reviewed most recent   Imaging: reviewed most recent    Assessment:     1. At high risk for breast cancer    2. Family history of breast cancer    3. Encounter for screening mammogram for malignant neoplasm of breast          1. Increased risk of breast cancer   * Tyrer-Cuzick (TC) lifetime risk of 24.1%. (recalculated today) We discussed that TC " score will categorize your lifetime risk of being diagnosed with breast cancer. Categories are as such: Average risk <15%, Intermediate risk 15-19%, and High risk > or = to 20%.    *The Gilda Model for Breast Cancer risk: not able to calculate today given age  Recommendation for women with elevated TC score:     Recommendation for women with elevated Gilda Model.         Risk factors are categorized into 2 groups: Modifiable and Non-modifiable. Modifiable risk factors include use of hormones, alcohol, smoking, diet and exercise. Non-modifiable risk factors include breast density, genetics, chest radiation, previous pregnancies, age of first period, and age of menopause.      * For women at high risk for breast cancer, endocrine therapy can reduce the risk of invasive and/or in situ breast cancers. (tamoxifen for premenopausal or postmenopausal women and raloxifene or exemestane for postmenopausal women).     * Discussed that Tamoxifen 20 mg daily for 5 years has shown to reduce risk of breast cancer by 49% and women with ADH/ALH or LCIS have an even more significant reduction of risk of 86%. Aromatase inhibitors for 5 years have also shown risk reduction in terms of 50-60%. Newer studies show that Tamoxifen 5 mg for three years shows comparable risk reduction.  At current, there is not adequate data to recommend longer courses of therapy more than 5 years for risk reduction.      * Tamoxifen has limited data in BRCA 1/2 mutation carriers but limited retrospective data is suggestive of benefit. There is retrospective data that aromatase inhibitors can reduce the risk of contralateral ER positive breast cancers in BRCA 1/2 patients who were taking AIs as adjuvant therapy. There is no data for raloxifen in the population.      * Reviewed risks of Tamoxifen side effects include hot flashes, invasive endometrial cancer in women > 49 years of age (2.3/1000 compared to 0.9/1000), cataracts, increased risk of pulmonary  embolism among others.     * Reviewed Lifestyle modifications which have shown benefit:  Limit alcohol consumption to less than 1 drink per day (1 ounce liquor, 6 oz wine, 8 oz beer)  Avoid smoking.  Exercise at least 150 minutes per week of moderate intensity aerobic activity or at least 75 minutes of vigorous activity. Exercise can lower the relative risk of breast cancer by ~18-20%.  Maintain healthy weight and avoid post-menopausal weight gain. Avoid processed foods and eat more lean proteins, fruits and vegetables.                  * Discussed available resources including genetic counseling, nutrition, weight management.      * Reviewed future screening:   Semiannual (every 6 months) CBE (clinical breast exam).   Annual Breast MRI alternating with an annual MMG. if TC 20% or greater.     FDA  link about Gadolinium exposure:      https://www.fda.gov/drugs/drug-safety-and-availability/fda-drug-safety-communication-fda-identifies-no-harmful-effects-date-brain-retention-gadolinium     https://www.fda.gov/Drugs/DrugSafety/ohc493381.htm               Discussed with patient that there are several models available for stratifying breast cancer risk, and Leonardo-Zaida is presently the model utilized by [facility] and is a model recommended per current NCCN guidelines.    The Gilda Model for Breast Cancer risk estimates the absolute 5 year risk and lifetime risk of developing breast cancer. Family history includes only first degree relatives with breast cancer, which is not enough information to estimate the risk of a patient having BRCA mutation. It also underestimates the cancer risk for patients with extensive family history. The Gilda Model is a good predictor of risk for populations but not for individuals. It adjusts risk for race/ethnicity. It may underestimate breast cancer risk in patients with atypical hyperplasia and strong family history. The Gilda Model was NOT designed to estimate risk for: Women  with a prior diagnosis of breast cancer, lobular carcinoma in situ (LCIS), or ductal carcinoma in situ (DCIS);  Women who have received previous radiation therapy to the chest for treatment of Hodgkin lymphoma;  Women with gene mutations in BRCA1 or BRCA2, or those who are known to have certain genetic syndromes that increase risk for breast cancer; Women of age <35 or >85.              Plan:     Chemoprevention not indicated at this time  Patient elects to proceed with annual mammograms now.  will consider adding in a yearly MRI at age 38, youngest family member at time of diagnosis was 48  Patient will follow up with PCP or GYN for one semiannual CBE in September or October 2024 along with annual mammogram in September and will RTC here for one semiannual CBE in February/March 2025  Lifestyle modifications as detailed above.   5.   Encouraged breast awareness, including monthly breast self-exams.   6.   Refer to Genetic counseling.     RTC in 9 months to see me either at [facility] or on 3rd floor..     Questions were encouraged and answered to patient's satisfaction, and patient verbalized understanding of information and agreement with the plan. Advised patient to RTC with any interval changes or concerns.      Patient is in agreement with the proposed treatment plan. All questions were answered to the patient's satisfaction. Patient knows to call clinic for any new or worsening symptoms and if anything is needed before the next clinic visit.    DELLA Chacon      Med Onc Chart Routing      Follow up with physician    Follow up with OSVALDO . March 2025   Infusion scheduling note    Injection scheduling note    Labs    Imaging Mammogram   September 2024   Pharmacy appointment    Other referrals                DELLA Chacon  Hematology & Medical Oncology   [address] [place] [id number]  ph. [phone]  Fax. [phone]    Total time spent with the patient: 30 minutes, 20 minutes of face  to face consultation and 10 minutes of chart review and coordination of care, on the day of the visit. This includes face to face time and non-face to face time preparing to see the patient (eg, review of tests), obtaining and/or reviewing separately obtained history, documenting clinical information in the electronic or other health record, independently interpreting resultsand communicating results to the patient/family/caregiver, or care coordination.

## 2024-09-06 ENCOUNTER — OFFICE VISIT (OUTPATIENT)
Dept: HEMATOLOGY/ONCOLOGY | Facility: CLINIC | Age: 34
End: 2024-09-06
Payer: COMMERCIAL

## 2024-09-06 DIAGNOSIS — Z80.3 FAMILY HISTORY OF BREAST CANCER: ICD-10-CM

## 2024-09-06 DIAGNOSIS — Z71.83 ENCOUNTER FOR NONPROCREATIVE GENETIC COUNSELING: Primary | ICD-10-CM

## 2024-09-06 DIAGNOSIS — Z91.89 AT HIGH RISK FOR BREAST CANCER: ICD-10-CM

## 2024-09-06 PROCEDURE — 1159F MED LIST DOCD IN RCRD: CPT | Mod: CPTII,95,, | Performed by: PHYSICIAN ASSISTANT

## 2024-09-06 PROCEDURE — 99215 OFFICE O/P EST HI 40 MIN: CPT | Mod: 95,,, | Performed by: PHYSICIAN ASSISTANT

## 2024-09-06 PROCEDURE — 1160F RVW MEDS BY RX/DR IN RCRD: CPT | Mod: CPTII,95,, | Performed by: PHYSICIAN ASSISTANT

## 2024-09-09 ENCOUNTER — HOSPITAL ENCOUNTER (OUTPATIENT)
Dept: RADIOLOGY | Facility: HOSPITAL | Age: 34
Discharge: HOME | End: 2024-09-09
Attending: NURSE PRACTITIONER
Payer: COMMERCIAL

## 2024-09-09 VITALS — BODY MASS INDEX: 44.22 KG/M2 | HEIGHT: 64 IN | WEIGHT: 259 LBS

## 2024-09-09 DIAGNOSIS — Z91.89 AT HIGH RISK FOR BREAST CANCER: ICD-10-CM

## 2024-09-09 DIAGNOSIS — Z12.31 ENCOUNTER FOR SCREENING MAMMOGRAM FOR MALIGNANT NEOPLASM OF BREAST: ICD-10-CM

## 2024-09-09 DIAGNOSIS — Z80.3 FAMILY HISTORY OF BREAST CANCER: ICD-10-CM

## 2024-09-09 PROCEDURE — 77063 BREAST TOMOSYNTHESIS BI: CPT | Mod: TC

## 2024-09-09 PROCEDURE — 77067 SCR MAMMO BI INCL CAD: CPT | Mod: TC

## 2024-09-09 PROCEDURE — 77063 BREAST TOMOSYNTHESIS BI: CPT | Mod: 26,,, | Performed by: RADIOLOGY

## 2024-09-09 PROCEDURE — 77067 SCR MAMMO BI INCL CAD: CPT | Mod: 26,,, | Performed by: RADIOLOGY

## 2025-01-27 ENCOUNTER — OFFICE VISIT (OUTPATIENT)
Dept: OBSTETRICS AND GYNECOLOGY | Facility: CLINIC | Age: 35
End: 2025-01-27
Attending: STUDENT IN AN ORGANIZED HEALTH CARE EDUCATION/TRAINING PROGRAM
Payer: COMMERCIAL

## 2025-01-27 VITALS
WEIGHT: 252.13 LBS | DIASTOLIC BLOOD PRESSURE: 80 MMHG | BODY MASS INDEX: 43.04 KG/M2 | HEIGHT: 64 IN | SYSTOLIC BLOOD PRESSURE: 128 MMHG

## 2025-01-27 DIAGNOSIS — E28.2 PCOS (POLYCYSTIC OVARIAN SYNDROME): ICD-10-CM

## 2025-01-27 DIAGNOSIS — Z01.419 WELL WOMAN EXAM: Primary | ICD-10-CM

## 2025-01-27 DIAGNOSIS — N93.9 ABNORMAL UTERINE BLEEDING (AUB): ICD-10-CM

## 2025-01-27 PROCEDURE — 99395 PREV VISIT EST AGE 18-39: CPT | Mod: S$GLB,,, | Performed by: STUDENT IN AN ORGANIZED HEALTH CARE EDUCATION/TRAINING PROGRAM

## 2025-01-27 PROCEDURE — 3079F DIAST BP 80-89 MM HG: CPT | Mod: CPTII,S$GLB,, | Performed by: STUDENT IN AN ORGANIZED HEALTH CARE EDUCATION/TRAINING PROGRAM

## 2025-01-27 PROCEDURE — 3074F SYST BP LT 130 MM HG: CPT | Mod: CPTII,S$GLB,, | Performed by: STUDENT IN AN ORGANIZED HEALTH CARE EDUCATION/TRAINING PROGRAM

## 2025-01-27 PROCEDURE — 1159F MED LIST DOCD IN RCRD: CPT | Mod: CPTII,S$GLB,, | Performed by: STUDENT IN AN ORGANIZED HEALTH CARE EDUCATION/TRAINING PROGRAM

## 2025-01-27 PROCEDURE — 99999 PR PBB SHADOW E&M-EST. PATIENT-LVL III: CPT | Mod: PBBFAC,,, | Performed by: STUDENT IN AN ORGANIZED HEALTH CARE EDUCATION/TRAINING PROGRAM

## 2025-01-27 PROCEDURE — 3008F BODY MASS INDEX DOCD: CPT | Mod: CPTII,S$GLB,, | Performed by: STUDENT IN AN ORGANIZED HEALTH CARE EDUCATION/TRAINING PROGRAM

## 2025-01-27 RX ORDER — TIRZEPATIDE 2.5 MG/.5ML
2.5 INJECTION, SOLUTION SUBCUTANEOUS
COMMUNITY
Start: 2024-12-31

## 2025-01-27 RX ORDER — PROGESTERONE 200 MG/1
200 CAPSULE ORAL NIGHTLY
Qty: 30 CAPSULE | Refills: 11 | Status: SHIPPED | OUTPATIENT
Start: 2025-01-27 | End: 2025-02-26

## 2025-01-27 NOTE — PROGRESS NOTES
Chief Complaint: Well Woman Exam     HPI:      Roxanna Banuelos is a 34 y.o.  who presents today for well woman exam.  LMP: Patient's last menstrual period was 01/10/2025.  PCOS and hypothyroid.  Came off ocps.  Cycle irregularities. No other issues, problems, or complaints. Specifically, patient denies abnormal vaginal bleeding, discharge, pelvic pain, urinary problems, or changes in appetite. Ms. Banuelos is not currently sexually active.     Previous Pap: 8/3/2023 NEM, hpv neg    OB History          0    Para   0    Term   0       0    AB   0    Living   0         SAB   0    IAB   0    Ectopic   0    Multiple   0    Live Births   0               Past Medical History:   Diagnosis Date    Hypothyroid     Oligomenorrhea 01/15/2013     Past Surgical History:   Procedure Laterality Date    REDUCTION OF BOTH BREASTS      SCALP LESION REMOVAL W/ FLAP AND SKIN GRAFT      TONSILLECTOMY, ADENOIDECTOMY      TOTAL REDUCTION MAMMOPLASTY       Social History     Socioeconomic History    Marital status: Single   Tobacco Use    Smoking status: Never   Substance and Sexual Activity    Alcohol use: Never     Comment: social    Drug use: Never    Sexual activity: Never   Social History Narrative    Senior at [employer].     Social Drivers of Health     Financial Resource Strain: Patient Declined (2024)    Received from [facility]    Overall Financial Resource Strain (CARDIA)     Difficulty of Paying Living Expenses: Patient declined   Food Insecurity: Patient Declined (2024)    Received from [facility]    Hunger Vital Sign     Worried About Running Out of Food in the Last Year: Patient declined     Ran Out of Food in the Last Year: Patient declined   Transportation Needs: Patient Declined (2024)    Received from [facility]    PRAPARE - Transportation     Lack of Transportation (Medical): Patient declined     Lack of Transportation (Non-Medical): Patient declined   Physical Activity: Insufficiently  Active (6/27/2024)    Received from [facility]    Exercise Vital Sign     Days of Exercise per Week: 3 days     Minutes of Exercise per Session: 20 min   Stress: No Stress Concern Present (6/27/2024)    Received from [facility]    [employer] - Occupational Stress Questionnaire     Feeling of Stress : Only a little   Housing Stability: Low Risk  (2/11/2024)    Housing Stability Vital Sign     Unable to Pay for Housing in the Last Year: No     Number of Places Lived in the Last Year: 1     Unstable Housing in the Last Year: No     Family History   Problem Relation Name Age of Onset    Heart attack Paternal Grandfather  62    Breast cancer Paternal Grandmother  48        might have been bilateral    Breast cancer Maternal Grandmother  82        Unilateral    BRCA1 Negative Maternal Grandmother      BRCA2 Negative Maternal Grandmother      Skin cancer Father      Breast cancer Paternal Aunt x6     Colon cancer Neg Hx      Ovarian cancer Neg Hx      Diabetes Neg Hx         Current Outpatient Medications:     minoxidiL (LONITEN) 2.5 MG tablet, Take 2.5 mg by mouth., Disp: , Rfl:     multivitamin capsule, Take 1 capsule by mouth once daily., Disp: , Rfl:     spironolactone (ALDACTONE) 100 MG tablet, Take 200 mg by mouth., Disp: , Rfl:     TIROSINT 50 mcg Cap, TAKE ONE Capsule BY MOUTH EVERY DAY, Disp: 90 capsule, Rfl: 0    tirzepatide, weight loss, (ZEPBOUND) 2.5 mg/0.5 mL Soln, Inject 2.5 mg into the skin., Disp: , Rfl:     traZODone (DESYREL) 50 MG tablet, Take 75 mg by mouth nightly as needed., Disp: , Rfl:     EScitalopram oxalate (LEXAPRO) 20 MG tablet, Take 20 mg by mouth. (Patient not taking: Reported on 1/27/2025), Disp: , Rfl:     progesterone (PROMETRIUM) 200 MG capsule, Take 1 capsule (200 mg total) by mouth every evening., Disp: 30 capsule, Rfl: 11    ROS:     GENERAL: Denies unintentional weight gain or weight loss. Feeling well overall.   SKIN: Denies rash or lesions.   HEENT:  "Denies headaches, or vision changes.   CARDIOVASCULAR: Denies palpitations or chest pain.   RESPIRATORY: Denies shortness of breath or dyspnea on exertion.  BREASTS: Denies pain, lumps, or nipple discharge.   ABDOMEN: Denies abdominal pain, constipation, diarrhea, nausea, vomiting, change in appetite.  URINARY: Denies frequency, dysuria, hematuria.  NEUROLOGIC: Denies syncope or weakness.   PSYCHIATRIC: Denies depression, anxiety or mood swings.    Physical Exam:      PHYSICAL EXAM:  /80   Ht 5' 4" (1.626 m)   Wt 114.4 kg (252 lb 1.5 oz)   LMP 01/10/2025   BMI 43.27 kg/m²   Body mass index is 43.27 kg/m².     APPEARANCE: Well nourished, well developed, in no acute distress.  PSYCH: Appropriate mood and affect.  SKIN: No acne or hirsutism.  NECK: Neck symmetric without masses or thyromegaly.  NODES: No inguinal, axillary, or supraclavicular lymph node enlargement.  BREASTS: Symmetrical, no skin changes or visible lesions.  No palpable masses or nipple discharge bilaterally.  ABDOMEN: Soft.  No tenderness or masses.    PELVIC: Normal external genitalia without lesions.  Normal hair distribution.  Adequate perineal body, normal urethral meatus.  Vagina moist and well rugated without lesions or discharge.  Cervix pink, without lesions, discharge or tenderness.  No significant cystocele or rectocele.  Bimanual exam shows uterus to be normal size, regular, mobile and nontender.  Adnexa without masses or tenderness.    EXTREMITIES: No edema.  No tenderness to palpation.  Female chaperone was present for exam. Declines upt    Assessment/Plan:     34 y.o.     Well woman exam    PCOS (polycystic ovarian syndrome)  -     US Pelvis Comp with Transvag NON-OB (xpd; Future; Expected date: 2025    Abnormal uterine bleeding (AUB)  -     US Pelvis Comp with Transvag NON-OB (xpd; Future; Expected date: 2025    Other orders  -     progesterone (PROMETRIUM) 200 MG capsule; Take 1 capsule (200 mg total) by " mouth every evening.  Dispense: 30 capsule; Refill: 11            Counselin.  Annual exam performed today without difficulty.  Patient was counseled today on current ASCCP pap guidelines, the recommendation for yearly pelvic exams, healthy diet and exercise routines, breast self awareness.  Pap smear collected.  All questions answered.  U/S and labs for irregularities and eval.  2.  Contraception:  declines  3.  Follow up with PCP for other health maintenance.  4.  Follow up in about 4 weeks (around 2025).      Use of the [facility] Patient Portal discussed and encouraged during today's visit.

## 2025-02-18 ENCOUNTER — PATIENT MESSAGE (OUTPATIENT)
Dept: OBSTETRICS AND GYNECOLOGY | Facility: CLINIC | Age: 35
End: 2025-02-18
Payer: COMMERCIAL

## 2025-02-24 ENCOUNTER — OFFICE VISIT (OUTPATIENT)
Dept: OBSTETRICS AND GYNECOLOGY | Facility: CLINIC | Age: 35
End: 2025-02-24
Attending: STUDENT IN AN ORGANIZED HEALTH CARE EDUCATION/TRAINING PROGRAM
Payer: COMMERCIAL

## 2025-02-24 DIAGNOSIS — N93.9 ABNORMAL UTERINE BLEEDING (AUB): Primary | ICD-10-CM

## 2025-02-24 PROCEDURE — 98006 SYNCH AUDIO-VIDEO EST MOD 30: CPT | Mod: 95,,, | Performed by: STUDENT IN AN ORGANIZED HEALTH CARE EDUCATION/TRAINING PROGRAM

## 2025-02-24 RX ORDER — NORGESTIMATE AND ETHINYL ESTRADIOL 0.25-0.035
KIT ORAL
Qty: 90 TABLET | Refills: 1 | Status: SHIPPED | OUTPATIENT
Start: 2025-02-24

## 2025-03-02 NOTE — PROGRESS NOTES
The patient location is: Home  The chief complaint leading to consultation is: AUB    Visit type: audiovisual    Face to Face time with patient: 10 mins  20 minutes of total time spent on the encounter, which includes face to face time and non-face to face time preparing to see the patient (eg, review of tests), Obtaining and/or reviewing separately obtained history, Documenting clinical information in the electronic or other health record, Independently interpreting results (not separately reported) and communicating results to the patient/family/caregiver, or Care coordination (not separately reported).         Each patient to whom he or she provides medical services by telemedicine is:  (1) informed of the relationship between the physician and patient and the respective role of any other health care provider with respect to management of the patient; and (2) notified that he or she may decline to receive medical services by telemedicine and may withdraw from such care at any time.    Notes:   Chief Complaint: PCOS, hypothyroid, aub     HPI:      Roxanna Banueols is a 34 y.o.  who presents today for follow up.  Seen earlier this year for annual.  Has history of pcos and hypothyroid.  Previously well controlled on ocps.  Came off ocps and has had some cycle irregularities.  Tried prometrium with little improvement.  Has had bleeding for the past month.   LMP: Patient's last menstrual period was 01/10/2025..  No other issues, problems, or complaints.     OB History          0    Para   0    Term   0       0    AB   0    Living   0         SAB   0    IAB   0    Ectopic   0    Multiple   0    Live Births   0               Past Medical History:   Diagnosis Date    Hypothyroid     Oligomenorrhea 01/15/2013     Past Surgical History:   Procedure Laterality Date    REDUCTION OF BOTH BREASTS      SCALP LESION REMOVAL W/ FLAP AND SKIN GRAFT      TONSILLECTOMY, ADENOIDECTOMY      TOTAL REDUCTION  MAMMOPLASTY       Social History[1]  Family History   Problem Relation Name Age of Onset    Heart attack Paternal Grandfather  62    Breast cancer Paternal Grandmother  48        might have been bilateral    Breast cancer Maternal Grandmother  82        Unilateral    BRCA1 Negative Maternal Grandmother      BRCA2 Negative Maternal Grandmother      Skin cancer Father      Breast cancer Paternal Aunt x6     Colon cancer Neg Hx      Ovarian cancer Neg Hx      Diabetes Neg Hx       Current Medications[2]    ROS:     GENERAL: Denies unintentional weight gain or weight loss. Feeling well overall.   SKIN: Denies rash or lesions.   HEENT: Denies headaches, or vision changes.   ABDOMEN: Denies abdominal pain, constipation, diarrhea, nausea, vomiting, change in appetite.  URINARY: Denies frequency, dysuria, hematuria.  NEUROLOGIC: Denies syncope or weakness.   PSYCHIATRIC: Denies depression, anxiety or mood swings.    Physical Exam:      PHYSICAL EXAM:  LMP 01/10/2025   There is no height or weight on file to calculate BMI.     APPEARANCE: Well nourished, well developed, in no acute distress.  PSYCH: Appropriate mood and affect.  SKIN: No acne or hirsutism.      Assessment/Plan:     34 y.o.     Abnormal uterine bleeding (AUB)  -     TSH; Future; Expected date: 2025  -     Prolactin; Future; Expected date: 2025  -     Testosterone; Future; Expected date: 2025  -     Testosterone, Free; Future; Expected date: 2025  -     17-Hydroxyprogesterone; Future; Expected date: 2025  -     CBC Auto Differential; Future; Expected date: 2025  -     ESTRADIOL; Future; Expected date: 2025  -     FOLLICLE STIMULATING HORMONE; Future; Expected date: 2025    Other orders  -     norgestimate-ethinyl estradioL (ORTHO-CYCLEN) 0.25-35 mg-mcg per tablet; Take 1 tablet by mouth three times daily x 7 days, then 1 tablet by mouth two times daily x 7 days, then daily.  Dispense: 90 tablet; Refill:  1          Counselin.  PCOS, AUB:  Will go ahead and repeat labs and also schedule pelvic u/s for further evaluation.  Discussed tirla of ocp cascade to help with bleeding.  R/B/A reviewed including but not limited to risk of cramping, irregular bleeding, nausea, bloating, breast tenderness, headache, stroke, blood clot, heart attack.  Patient voiced understanding and desires to proceed with treatment.  Pt in agreement iwht plan.  Upt prior to meds.  She will call with any issues or concerns.  RTC for U/S in 2-4 weeks or sooner if needed.   Use of the [employer] Patient Portal discussed and encouraged during today's visit.                  [1]   Social History  Socioeconomic History    Marital status: Single   Tobacco Use    Smoking status: Never   Substance and Sexual Activity    Alcohol use: Never     Comment: social    Drug use: Never    Sexual activity: Never   Social History Narrative    Senior at [employer].     Social Drivers of Health     Financial Resource Strain: Patient Declined (2024)    Received from [facility]    Overall Financial Resource Strain (CARDIA)     Difficulty of Paying Living Expenses: Patient declined   Food Insecurity: Patient Declined (2024)    Received from [facility]    Hunger Vital Sign     Worried About Running Out of Food in the Last Year: Patient declined     Ran Out of Food in the Last Year: Patient declined   Transportation Needs: Patient Declined (2024)    Received from [facility]    PRAPARE - Transportation     Lack of Transportation (Medical): Patient declined     Lack of Transportation (Non-Medical): Patient declined   Physical Activity: Insufficiently Active (2024)    Received from [facility]    Exercise Vital Sign     Days of Exercise per Week: 3 days     Minutes of Exercise per Session: 20 min   Stress: No Stress Concern Present (2024)    Received from [facility]    [employer] - Occupational Stress Questionnaire      Feeling of Stress : Only a little   Housing Stability: Low Risk  (2/11/2024)    Housing Stability Vital Sign     Unable to Pay for Housing in the Last Year: No     Number of Places Lived in the Last Year: 1     Unstable Housing in the Last Year: No   [2]   Current Outpatient Medications:     EScitalopram oxalate (LEXAPRO) 20 MG tablet, Take 20 mg by mouth. (Patient not taking: Reported on 1/27/2025), Disp: , Rfl:     minoxidiL (LONITEN) 2.5 MG tablet, Take 2.5 mg by mouth., Disp: , Rfl:     multivitamin capsule, Take 1 capsule by mouth once daily., Disp: , Rfl:     norgestimate-ethinyl estradioL (ORTHO-CYCLEN) 0.25-35 mg-mcg per tablet, Take 1 tablet by mouth three times daily x 7 days, then 1 tablet by mouth two times daily x 7 days, then daily., Disp: 90 tablet, Rfl: 1    progesterone (PROMETRIUM) 200 MG capsule, Take 1 capsule (200 mg total) by mouth every evening., Disp: 30 capsule, Rfl: 11    spironolactone (ALDACTONE) 100 MG tablet, Take 200 mg by mouth., Disp: , Rfl:     TIROSINT 50 mcg Cap, TAKE ONE Capsule BY MOUTH EVERY DAY, Disp: 90 capsule, Rfl: 0    tirzepatide, weight loss, (ZEPBOUND) 2.5 mg/0.5 mL Soln, Inject 2.5 mg into the skin., Disp: , Rfl:     traZODone (DESYREL) 50 MG tablet, Take 75 mg by mouth nightly as needed., Disp: , Rfl:

## 2025-03-21 ENCOUNTER — OFFICE VISIT (OUTPATIENT)
Dept: OBSTETRICS AND GYNECOLOGY | Facility: CLINIC | Age: 35
End: 2025-03-21
Payer: COMMERCIAL

## 2025-03-21 ENCOUNTER — LAB VISIT (OUTPATIENT)
Dept: LAB | Facility: HOSPITAL | Age: 35
End: 2025-03-21
Attending: STUDENT IN AN ORGANIZED HEALTH CARE EDUCATION/TRAINING PROGRAM
Payer: COMMERCIAL

## 2025-03-21 VITALS
SYSTOLIC BLOOD PRESSURE: 124 MMHG | HEIGHT: 64 IN | DIASTOLIC BLOOD PRESSURE: 78 MMHG | BODY MASS INDEX: 42.9 KG/M2 | WEIGHT: 251.31 LBS

## 2025-03-21 DIAGNOSIS — N93.9 ABNORMAL UTERINE BLEEDING (AUB): ICD-10-CM

## 2025-03-21 DIAGNOSIS — N93.9 ABNORMAL UTERINE BLEEDING (AUB): Primary | ICD-10-CM

## 2025-03-21 LAB
BASOPHILS # BLD AUTO: 0.05 K/UL (ref 0–0.2)
BASOPHILS NFR BLD: 0.6 % (ref 0–1.9)
DIFFERENTIAL METHOD BLD: ABNORMAL
EOSINOPHIL # BLD AUTO: 0.1 K/UL (ref 0–0.5)
EOSINOPHIL NFR BLD: 0.9 % (ref 0–8)
ERYTHROCYTE [DISTWIDTH] IN BLOOD BY AUTOMATED COUNT: 14.2 % (ref 11.5–14.5)
ESTRADIOL SERPL-MCNC: 21 PG/ML
FSH SERPL-ACNC: 5.73 MIU/ML
HCT VFR BLD AUTO: 38.7 % (ref 37–48.5)
HGB BLD-MCNC: 11.9 G/DL (ref 12–16)
IMM GRANULOCYTES # BLD AUTO: 0.02 K/UL (ref 0–0.04)
IMM GRANULOCYTES NFR BLD AUTO: 0.3 % (ref 0–0.5)
LYMPHOCYTES # BLD AUTO: 2 K/UL (ref 1–4.8)
LYMPHOCYTES NFR BLD: 25 % (ref 18–48)
MCH RBC QN AUTO: 26.6 PG (ref 27–31)
MCHC RBC AUTO-ENTMCNC: 30.7 G/DL (ref 32–36)
MCV RBC AUTO: 86 FL (ref 82–98)
MONOCYTES # BLD AUTO: 0.7 K/UL (ref 0.3–1)
MONOCYTES NFR BLD: 8.8 % (ref 4–15)
NEUTROPHILS # BLD AUTO: 5.2 K/UL (ref 1.8–7.7)
NEUTROPHILS NFR BLD: 64.4 % (ref 38–73)
NRBC BLD-RTO: 0 /100 WBC
PLATELET # BLD AUTO: 628 K/UL (ref 150–450)
PMV BLD AUTO: 9.7 FL (ref 9.2–12.9)
PROLACTIN SERPL IA-MCNC: 9.2 NG/ML (ref 5.2–26.5)
RBC # BLD AUTO: 4.48 M/UL (ref 4–5.4)
TESTOST SERPL-MCNC: 44 NG/DL (ref 5–73)
TSH SERPL DL<=0.005 MIU/L-ACNC: 2.15 UIU/ML (ref 0.4–4)
WBC # BLD AUTO: 7.99 K/UL (ref 3.9–12.7)

## 2025-03-21 PROCEDURE — 3008F BODY MASS INDEX DOCD: CPT | Mod: CPTII,S$GLB,, | Performed by: STUDENT IN AN ORGANIZED HEALTH CARE EDUCATION/TRAINING PROGRAM

## 2025-03-21 PROCEDURE — 84146 ASSAY OF PROLACTIN: CPT | Performed by: STUDENT IN AN ORGANIZED HEALTH CARE EDUCATION/TRAINING PROGRAM

## 2025-03-21 PROCEDURE — 99999 PR PBB SHADOW E&M-EST. PATIENT-LVL III: CPT | Mod: PBBFAC,,, | Performed by: STUDENT IN AN ORGANIZED HEALTH CARE EDUCATION/TRAINING PROGRAM

## 2025-03-21 PROCEDURE — 83001 ASSAY OF GONADOTROPIN (FSH): CPT | Performed by: STUDENT IN AN ORGANIZED HEALTH CARE EDUCATION/TRAINING PROGRAM

## 2025-03-21 PROCEDURE — 36415 COLL VENOUS BLD VENIPUNCTURE: CPT | Performed by: STUDENT IN AN ORGANIZED HEALTH CARE EDUCATION/TRAINING PROGRAM

## 2025-03-21 PROCEDURE — 82670 ASSAY OF TOTAL ESTRADIOL: CPT | Performed by: STUDENT IN AN ORGANIZED HEALTH CARE EDUCATION/TRAINING PROGRAM

## 2025-03-21 PROCEDURE — 3074F SYST BP LT 130 MM HG: CPT | Mod: CPTII,S$GLB,, | Performed by: STUDENT IN AN ORGANIZED HEALTH CARE EDUCATION/TRAINING PROGRAM

## 2025-03-21 PROCEDURE — 99213 OFFICE O/P EST LOW 20 MIN: CPT | Mod: 57,S$GLB,, | Performed by: STUDENT IN AN ORGANIZED HEALTH CARE EDUCATION/TRAINING PROGRAM

## 2025-03-21 PROCEDURE — 3078F DIAST BP <80 MM HG: CPT | Mod: CPTII,S$GLB,, | Performed by: STUDENT IN AN ORGANIZED HEALTH CARE EDUCATION/TRAINING PROGRAM

## 2025-03-21 PROCEDURE — 84443 ASSAY THYROID STIM HORMONE: CPT | Performed by: STUDENT IN AN ORGANIZED HEALTH CARE EDUCATION/TRAINING PROGRAM

## 2025-03-21 PROCEDURE — 84402 ASSAY OF FREE TESTOSTERONE: CPT | Performed by: STUDENT IN AN ORGANIZED HEALTH CARE EDUCATION/TRAINING PROGRAM

## 2025-03-21 PROCEDURE — 83498 ASY HYDROXYPROGESTERONE 17-D: CPT | Performed by: STUDENT IN AN ORGANIZED HEALTH CARE EDUCATION/TRAINING PROGRAM

## 2025-03-21 PROCEDURE — 84403 ASSAY OF TOTAL TESTOSTERONE: CPT | Performed by: STUDENT IN AN ORGANIZED HEALTH CARE EDUCATION/TRAINING PROGRAM

## 2025-03-21 PROCEDURE — 85025 COMPLETE CBC W/AUTO DIFF WBC: CPT | Performed by: STUDENT IN AN ORGANIZED HEALTH CARE EDUCATION/TRAINING PROGRAM

## 2025-03-21 RX ORDER — CLONAZEPAM 0.5 MG/1
TABLET ORAL
COMMUNITY
Start: 2025-03-11

## 2025-03-21 RX ORDER — BUSPIRONE HYDROCHLORIDE 15 MG/1
15 TABLET ORAL 2 TIMES DAILY
COMMUNITY
Start: 2025-03-11

## 2025-03-21 RX ORDER — ONDANSETRON 8 MG/1
TABLET, ORALLY DISINTEGRATING ORAL
Status: ON HOLD | COMMUNITY
Start: 2025-03-11 | End: 2025-04-15 | Stop reason: HOSPADM

## 2025-03-21 RX ORDER — NORGESTIMATE AND ETHINYL ESTRADIOL 0.25-0.035
KIT ORAL
Qty: 90 TABLET | Refills: 1 | Status: SHIPPED | OUTPATIENT
Start: 2025-03-21

## 2025-03-24 LAB — TESTOST FREE SERPL-MCNC: 1.1 PG/ML

## 2025-03-25 ENCOUNTER — TELEPHONE (OUTPATIENT)
Dept: OBSTETRICS AND GYNECOLOGY | Facility: CLINIC | Age: 35
End: 2025-03-25
Payer: COMMERCIAL

## 2025-03-25 ENCOUNTER — RESULTS FOLLOW-UP (OUTPATIENT)
Dept: OBSTETRICS AND GYNECOLOGY | Facility: CLINIC | Age: 35
End: 2025-03-25

## 2025-03-25 DIAGNOSIS — N93.9 ABNORMAL UTERINE BLEEDING (AUB): Primary | ICD-10-CM

## 2025-03-25 LAB — 17OHP SERPL-MCNC: 46 NG/DL (ref 35–413)

## 2025-03-25 NOTE — LETTER
March 25, 2025    Roxanna Banuelos  [address]  [place] [address]              [facility]  [address], [address]  [facility] [address]  Phone: [phone]  Fax: [phone]    To Whom It May Concern:    Ms. Roxanna Banuelos is expected to have surgery on 4/15/25.  For this reason she is unable to participate in jury duty on 4/15.  Please reach out to me if you have any questions or concerns.  Thank you.    Sincerely,          Jessica Huddleston MD

## 2025-03-25 NOTE — TELEPHONE ENCOUNTER
Please confirm 4/15 surgery date is ok with her :)  Thank you!    Requested Date:  4/15    Requested Time:  2 pm    Case Length:60 minutes    Surgeon: Jessica Huddleston      Assistant Surgeon: None   Visit Type: Outpatient    LOCATION: [facility]    PROCEDURE:hysteroscopy d&C  Diagnosis:aub  Anesthesia type: General   Comments/Special Equipment Needed:  Rep needed: No  Does the patient need a PreOp appointment with MD: Yes  U/S needed at pre-op: No  PCP clearance: Not Needed  When does she need her Post op appointment: 2 weeks virtual   Do you need additional time blocked out from clinic? No  If so, what time do you want to be back in clinic? na  When can the patient go back to work? one week

## 2025-03-25 NOTE — TELEPHONE ENCOUNTER
Pt returned call.  Sx date works.  Added to google.  Sx appts scheduled.    Dr. Huddleston, pt has jury duty on 4/14.  Are you ok with a letter stating she has surgery on 4/15 for her to present to jury duty?

## 2025-03-27 ENCOUNTER — RESULTS FOLLOW-UP (OUTPATIENT)
Dept: OBSTETRICS AND GYNECOLOGY | Facility: CLINIC | Age: 35
End: 2025-03-27

## 2025-04-02 NOTE — PROGRESS NOTES
"  Reason For Consultation:   Increased lifetime risk of breast cancer    Referring Provider:   No referring provider defined for this encounter.    Records Obtained: Records of the patients history including those obtained from the referring provider were reviewed and summarized in detail.    HPI:   Roxanna Banuelos presents for a follow up for increased risk of breast cancer. She is premenopausal. She presented for screening mammogram on 23 which was benign but revealed a Tyrer-Cuzick score of 23.95%.     Today, Feels generally well  No breast concerns.  Working on weight loss with pcp  Since the beginning of the year she has been having a constant menstrual cycle.  Was put on birth control by her gyn, which did not help.  Pelvic us showed endometrial thickening and a polyp.  She will be having surgery for this on the   Breast tender because now on birth control.  Fatigued d/t anemia from heavy constant menstrual cycle  She now has a paternal aunt that was just diagnosed with breast cancer    High Risk Breast cancer specific history:  - Age: 34 y.o.   - Height:  5'4"  - Weight: 254 lbs  - Breast density per BI-RADS: almost entirely fatty   - Age at menarche:  11  - Number of pregnancies: ; age of first live birth: n/a   - History of breast feeding: No.   - Age at menopause, if applicable:  n/a.  -Uterus and ovaries intact: Yes  - HRT: No, does take oral contraceptives    - Genetic testing: met with genetics, but was told her family hx was not particularly concerning for a genetic mutation  - Personal history of cancer: No  - Previous chest radiation exposure between ages 10-30 years old: No  - Personal history of breast biopsy: No  - [employer] Inheritance: No  - Family history of cancer:  paternal grandmother: breast cancer at 48,  at 52, both breasts  Maternal grandmother: breast cancer at 83, now 84, one breast, brca negative  Maternal grandmother's sister: breast cancer  Maternal " grandmother's sister's grandchild: breast cancer  Paternal aunt: breast cancer at 64, alive  Dad: skin cancer  Mom: has breast cysts    Social History:  Tobacco use:  none  Alcohol use:  social alcohol use  Exercise regimen: 30 minutes 3 times per week (pilates 2 to 3 times per week)  Employment:       SEE CALCULATED RISK BELOW.     Past Medical   Past Medical History:   Diagnosis Date    Hypothyroid     Oligomenorrhea 01/15/2013     Patient Active Problem List   Diagnosis    Hirsutism    Abnormal weight gain    Dyslipidemia    Generalized anxiety disorder    Irregular menstrual cycle    PCOS (polycystic ovarian syndrome)    Primary hypothyroidism    Morbid obesity     Social History   Social History     Tobacco Use    Smoking status: Never   Substance Use Topics    Alcohol use: Never     Comment: social    Drug use: Never     Family History  Family History   Problem Relation Name Age of Onset    Skin cancer Father      Breast cancer Maternal Grandmother  82        Unilateral    BRCA1 Negative Maternal Grandmother      BRCA2 Negative Maternal Grandmother      Breast cancer Paternal Grandmother  48        might have been bilateral    Heart attack Paternal Grandfather  62    Breast cancer Paternal Aunt x6 64    Colon cancer Neg Hx      Ovarian cancer Neg Hx      Diabetes Neg Hx       Medications    Current Outpatient Medications:     busPIRone (BUSPAR) 15 MG tablet, Take 15 mg by mouth 2 (two) times daily., Disp: , Rfl:     clonazePAM (KLONOPIN) 0.5 MG tablet, TAKE 0.5 -1 TABLET BY MOUTH TWICE DAILY AS NEEDED, Disp: , Rfl:     EScitalopram oxalate (LEXAPRO) 20 MG tablet, Take 20 mg by mouth., Disp: , Rfl:     minoxidiL (LONITEN) 2.5 MG tablet, Take 2.5 mg by mouth., Disp: , Rfl:     multivitamin capsule, Take 1 capsule by mouth once daily., Disp: , Rfl:     norgestimate-ethinyl estradioL (ORTHO-CYCLEN) 0.25-35 mg-mcg per tablet, Take 1 tablet by mouth three times daily x 7 days, then 1 tablet by mouth two  "times daily x 7 days, then daily., Disp: 90 tablet, Rfl: 1    ondansetron (ZOFRAN-ODT) 8 MG TbDL, DISSOLVE 1 TABLET ON THE TONGUE EVERY 8 HOURS AS NEEDED, Disp: , Rfl:     spironolactone (ALDACTONE) 100 MG tablet, Take 200 mg by mouth., Disp: , Rfl:     TIROSINT 50 mcg Cap, TAKE ONE Capsule BY MOUTH EVERY DAY, Disp: 90 capsule, Rfl: 0    tirzepatide, weight loss, (ZEPBOUND) 2.5 mg/0.5 mL Soln, Inject 2.5 mg into the skin., Disp: , Rfl:     traZODone (DESYREL) 50 MG tablet, Take 75 mg by mouth nightly as needed., Disp: , Rfl:   Allergies  Review of patient's allergies indicates:   Allergen Reactions    Cucumber (cucumis sativus) Hives    Horse dander     Watermelon Hives       Review of Systems       See above   Review of Systems  Review of Systems   Constitutional:  Positive for fatigue. Negative for appetite change and unexpected weight change.   HENT:  Negative for mouth sores.    Eyes:  Negative for visual disturbance.   Respiratory:  Negative for cough and shortness of breath.    Cardiovascular:  Negative for chest pain.   Gastrointestinal:  Negative for abdominal pain and diarrhea.   Genitourinary:  Negative for frequency.   Musculoskeletal:  Negative for back pain.   Skin:  Negative for rash.   Neurological:  Negative for headaches.   Hematological:  Negative for adenopathy.   Psychiatric/Behavioral:  The patient is not nervous/anxious.      All other systems reviewed and are negative.    Objective:      Vitals:   Vitals:    04/03/25 0718   BP: 132/71   Pulse: 78   Resp: 17   Temp: 98 °F (36.7 °C)   TempSrc: Oral   SpO2: 99%   Weight: 113 kg (249 lb 1.9 oz)   Height: 5' 4" (1.626 m)     BMI: Body mass index is 42.76 kg/m².   Body surface area is 2.26 meters squared.    Physical Exam  Constitutional:       General: She is not in acute distress.     Appearance: She is well-developed. She is not diaphoretic.   HENT:      Head: Normocephalic and atraumatic.   Eyes:      General: No scleral icterus.     " Conjunctiva/sclera: Conjunctivae normal.   Cardiovascular:      Rate and Rhythm: Normal rate and regular rhythm.      Pulses: Normal pulses.      Heart sounds: Normal heart sounds.   Pulmonary:      Effort: Pulmonary effort is normal. No respiratory distress.      Breath sounds: Normal breath sounds.   Chest:      Comments: No skin changes or breast masses noted bilaterally, no  lymphadenopathy noted, nipples flat since breast reduction in 2018    Abdominal:      General: There is no distension.   Musculoskeletal:         General: Normal range of motion.      Cervical back: Normal range of motion and neck supple.   Skin:     General: Skin is warm and dry.   Neurological:      Mental Status: She is alert and oriented to person, place, and time.   Psychiatric:         Behavior: Behavior normal.       Laboratory Data: reviewed most recent   Imaging: reviewed most recent    Assessment:     1. At high risk for breast cancer    2. Family history of breast cancer    3. Encounter for screening mammogram for malignant neoplasm of breast        1. Increased risk of breast cancer   * Tyrer-Cuzick (TC) lifetime risk of 25.58%. (recalculated today) We discussed that TC score will categorize your lifetime risk of being diagnosed with breast cancer. Categories are as such: Average risk <15%, Intermediate risk 15-19%, and High risk > or = to 20%.    *The Gilda Model for Breast Cancer risk: not able to calculate today given age  Recommendation for women with elevated TC score:     Recommendation for women with elevated Gilda Model.         Risk factors are categorized into 2 groups: Modifiable and Non-modifiable. Modifiable risk factors include use of hormones, alcohol, smoking, diet and exercise. Non-modifiable risk factors include breast density, genetics, chest radiation, previous pregnancies, age of first period, and age of menopause.      * For women at high risk for breast cancer, endocrine therapy can reduce the risk of  invasive and/or in situ breast cancers. (tamoxifen for premenopausal or postmenopausal women and raloxifene or exemestane for postmenopausal women).     * Discussed that Tamoxifen 20 mg daily for 5 years has shown to reduce risk of breast cancer by 49% and women with ADH/ALH or LCIS have an even more significant reduction of risk of 86%. Aromatase inhibitors for 5 years have also shown risk reduction in terms of 50-60%. Newer studies show that Tamoxifen 5 mg for three years shows comparable risk reduction.  At current, there is not adequate data to recommend longer courses of therapy more than 5 years for risk reduction.      * Tamoxifen has limited data in BRCA 1/2 mutation carriers but limited retrospective data is suggestive of benefit. There is retrospective data that aromatase inhibitors can reduce the risk of contralateral ER positive breast cancers in BRCA 1/2 patients who were taking AIs as adjuvant therapy. There is no data for raloxifen in the population.      * Reviewed risks of Tamoxifen side effects include hot flashes, invasive endometrial cancer in women > 49 years of age (2.3/1000 compared to 0.9/1000), cataracts, increased risk of pulmonary embolism among others.     * Reviewed Lifestyle modifications which have shown benefit:  Limit alcohol consumption to less than 1 drink per day (1 ounce liquor, 6 oz wine, 8 oz beer)  Avoid smoking.  Exercise at least 150 minutes per week of moderate intensity aerobic activity or at least 75 minutes of vigorous activity. Exercise can lower the relative risk of breast cancer by ~18-20%.  Maintain healthy weight and avoid post-menopausal weight gain. Avoid processed foods and eat more lean proteins, fruits and vegetables.                  * Discussed available resources including genetic counseling, nutrition, weight management.      * Reviewed future screening:   Semiannual (every 6 months) CBE (clinical breast exam).   Annual Breast MRI alternating with an annual  MMG. if TC 20% or greater.     FDA  link about Gadolinium exposure:      https://www.fda.gov/drugs/drug-safety-and-availability/fda-drug-safety-communication-fda-identifies-no-harmful-effects-date-brain-retention-gadolinium     https://www.fda.gov/Drugs/DrugSafety/xbi492165.htm               Discussed with patient that there are several models available for stratifying breast cancer risk, and Leonardo-Zaida is presently the model utilized by [facility] and is a model recommended per current NCCN guidelines.    The Gilda Model for Breast Cancer risk estimates the absolute 5 year risk and lifetime risk of developing breast cancer. Family history includes only first degree relatives with breast cancer, which is not enough information to estimate the risk of a patient having BRCA mutation. It also underestimates the cancer risk for patients with extensive family history. The Gilda Model is a good predictor of risk for populations but not for individuals. It adjusts risk for race/ethnicity. It may underestimate breast cancer risk in patients with atypical hyperplasia and strong family history. The Gilda Model was NOT designed to estimate risk for: Women with a prior diagnosis of breast cancer, lobular carcinoma in situ (LCIS), or ductal carcinoma in situ (DCIS);  Women who have received previous radiation therapy to the chest for treatment of Hodgkin lymphoma;  Women with gene mutations in BRCA1 or BRCA2, or those who are known to have certain genetic syndromes that increase risk for breast cancer; Women of age <35 or >85.              Plan:     Chemoprevention not indicated at this time  Patient elects to proceed with annual mammograms now.  will consider adding in a yearly MRI at age 38, youngest family member at time of diagnosis was 48  Patient will follow up here in October 2025 along with annual mammogram in September 5.   Encouraged breast awareness, including monthly breast self-exams.       RTC in 6  months to see me either at [facility] or on 3rd floor..     Questions were encouraged and answered to patient's satisfaction, and patient verbalized understanding of information and agreement with the plan. Advised patient to RTC with any interval changes or concerns.      Patient is in agreement with the proposed treatment plan. All questions were answered to the patient's satisfaction. Patient knows to call clinic for any new or worsening symptoms and if anything is needed before the next clinic visit.    Maggie Villarreal NP-CLAIR      Med Onc Chart Routing      Follow up with physician    Follow up with OSVALDO . October for high risk visit, in clinic   Infusion scheduling note    Injection scheduling note    Labs    Imaging Mammogram   September a week or two before office visit   Pharmacy appointment    Other referrals                  Maggie Villarreal NP-C  Hematology & Medical Oncology   [address] [place] [id number]  ph. [phone]  Fax. [phone]    Total time spent with the patient: 20 minutes, 10 minutes of face to face consultation and 10 minutes of chart review and coordination of care, on the day of the visit. This includes face to face time and non-face to face time preparing to see the patient (eg, review of tests), obtaining and/or reviewing separately obtained history, documenting clinical information in the electronic or other health record, independently interpreting resultsand communicating results to the patient/family/caregiver, or care coordination.

## 2025-04-03 ENCOUNTER — OFFICE VISIT (OUTPATIENT)
Dept: HEMATOLOGY/ONCOLOGY | Facility: CLINIC | Age: 35
End: 2025-04-03
Payer: COMMERCIAL

## 2025-04-03 VITALS
SYSTOLIC BLOOD PRESSURE: 132 MMHG | TEMPERATURE: 98 F | RESPIRATION RATE: 17 BRPM | OXYGEN SATURATION: 99 % | HEART RATE: 78 BPM | BODY MASS INDEX: 42.53 KG/M2 | DIASTOLIC BLOOD PRESSURE: 71 MMHG | WEIGHT: 249.13 LBS | HEIGHT: 64 IN

## 2025-04-03 DIAGNOSIS — Z12.31 ENCOUNTER FOR SCREENING MAMMOGRAM FOR MALIGNANT NEOPLASM OF BREAST: ICD-10-CM

## 2025-04-03 DIAGNOSIS — Z80.3 FAMILY HISTORY OF BREAST CANCER: ICD-10-CM

## 2025-04-03 DIAGNOSIS — Z91.89 AT HIGH RISK FOR BREAST CANCER: Primary | ICD-10-CM

## 2025-04-03 PROCEDURE — 99214 OFFICE O/P EST MOD 30 MIN: CPT | Mod: S$GLB,,, | Performed by: NURSE PRACTITIONER

## 2025-04-03 PROCEDURE — 3078F DIAST BP <80 MM HG: CPT | Mod: CPTII,S$GLB,, | Performed by: NURSE PRACTITIONER

## 2025-04-03 PROCEDURE — 3008F BODY MASS INDEX DOCD: CPT | Mod: CPTII,S$GLB,, | Performed by: NURSE PRACTITIONER

## 2025-04-03 PROCEDURE — 99999 PR PBB SHADOW E&M-EST. PATIENT-LVL IV: CPT | Mod: PBBFAC,,, | Performed by: NURSE PRACTITIONER

## 2025-04-03 PROCEDURE — G2211 COMPLEX E/M VISIT ADD ON: HCPCS | Mod: S$GLB,,, | Performed by: NURSE PRACTITIONER

## 2025-04-03 PROCEDURE — 1159F MED LIST DOCD IN RCRD: CPT | Mod: CPTII,S$GLB,, | Performed by: NURSE PRACTITIONER

## 2025-04-03 PROCEDURE — 3075F SYST BP GE 130 - 139MM HG: CPT | Mod: CPTII,S$GLB,, | Performed by: NURSE PRACTITIONER

## 2025-04-04 ENCOUNTER — ANESTHESIA EVENT (OUTPATIENT)
Dept: SURGERY | Facility: OTHER | Age: 35
End: 2025-04-04
Payer: COMMERCIAL

## 2025-04-04 RX ORDER — SODIUM CHLORIDE, SODIUM LACTATE, POTASSIUM CHLORIDE, CALCIUM CHLORIDE 600; 310; 30; 20 MG/100ML; MG/100ML; MG/100ML; MG/100ML
INJECTION, SOLUTION INTRAVENOUS CONTINUOUS
OUTPATIENT
Start: 2025-04-04

## 2025-04-04 RX ORDER — LIDOCAINE HYDROCHLORIDE 10 MG/ML
0.5 INJECTION, SOLUTION EPIDURAL; INFILTRATION; INTRACAUDAL; PERINEURAL ONCE
OUTPATIENT
Start: 2025-04-04 | End: 2025-04-04

## 2025-04-04 RX ORDER — ACETAMINOPHEN 500 MG
1000 TABLET ORAL
OUTPATIENT
Start: 2025-04-04 | End: 2025-04-04

## 2025-04-04 NOTE — ANESTHESIA PREPROCEDURE EVALUATION
04/04/2025  Roxanna Banuelos is a 34 y.o., female.      Pre-op Assessment    I have reviewed the Patient Summary Reports.     I have reviewed the Nursing Notes. I have reviewed the NPO Status.   I have reviewed the Medications.     Review of Systems  Anesthesia Hx:    Chipped tooth History of prior surgery of interest to airway management or planning:          Denies Family Hx of Anesthesia complications.   Personal Hx of Anesthesia complications   Difficult Intubation, according to patient history   Dental Injury, tooth chipped/damaged                Social:  Non-Smoker       Hematology/Oncology:  Hematology Normal   Oncology Normal                                   EENT/Dental:  EENT/Dental Normal           Cardiovascular:                hyperlipidemia                               Pulmonary:  Pulmonary Normal                       Renal/:  Renal/ Normal                 Hepatic/GI:  Hepatic/GI Normal        Taking GLP-1 Agonists Instructed to Hold for 7 Days           Musculoskeletal:  Musculoskeletal Normal                Neurological:  Neurology Normal                                      Endocrine:   Hypothyroidism  Last dose March 17!      Morbid Obesity / BMI > 40  Dermatological:  Skin Normal    Psych:  Psychiatric History anxiety               Physical Exam  General: Cooperative and Alert    Airway:  Mallampati: II   Mouth Opening: Normal  Neck ROM: Normal ROM    Dental:  Intact    Anesthesia Plan  Type of Anesthesia, risks & benefits discussed:    Anesthesia Type: Gen ETT, Gen Supraglottic Airway  Intra-op Monitoring Plan: Standard ASA Monitors  Post Op Pain Control Plan: multimodal analgesia  Induction:  IV  Airway Plan: Video, Post-Induction  ASA Score: 3  Anesthesia Plan Notes: CBC in epic,BMP today      Ready For Surgery From Anesthesia Perspective.     .

## 2025-04-07 ENCOUNTER — HOSPITAL ENCOUNTER (OUTPATIENT)
Dept: PREADMISSION TESTING | Facility: OTHER | Age: 35
Discharge: HOME | End: 2025-04-07
Attending: STUDENT IN AN ORGANIZED HEALTH CARE EDUCATION/TRAINING PROGRAM
Payer: COMMERCIAL

## 2025-04-07 ENCOUNTER — OFFICE VISIT (OUTPATIENT)
Dept: OBSTETRICS AND GYNECOLOGY | Facility: CLINIC | Age: 35
End: 2025-04-07
Attending: STUDENT IN AN ORGANIZED HEALTH CARE EDUCATION/TRAINING PROGRAM
Payer: COMMERCIAL

## 2025-04-07 VITALS
TEMPERATURE: 98 F | RESPIRATION RATE: 16 BRPM | BODY MASS INDEX: 42.51 KG/M2 | OXYGEN SATURATION: 96 % | HEIGHT: 64 IN | SYSTOLIC BLOOD PRESSURE: 164 MMHG | DIASTOLIC BLOOD PRESSURE: 101 MMHG | WEIGHT: 249 LBS | HEART RATE: 86 BPM

## 2025-04-07 VITALS
DIASTOLIC BLOOD PRESSURE: 90 MMHG | SYSTOLIC BLOOD PRESSURE: 138 MMHG | BODY MASS INDEX: 42.51 KG/M2 | WEIGHT: 249 LBS | HEIGHT: 64 IN

## 2025-04-07 DIAGNOSIS — N93.9 ABNORMAL UTERINE BLEEDING (AUB): ICD-10-CM

## 2025-04-07 DIAGNOSIS — Z01.818 PREOP TESTING: Primary | ICD-10-CM

## 2025-04-07 LAB
ANION GAP (OHS): 8 MMOL/L (ref 8–16)
BUN SERPL-MCNC: 10 MG/DL (ref 6–20)
CALCIUM SERPL-MCNC: 8.9 MG/DL (ref 8.7–10.5)
CHLORIDE SERPL-SCNC: 110 MMOL/L (ref 95–110)
CO2 SERPL-SCNC: 20 MMOL/L (ref 23–29)
CREAT SERPL-MCNC: 0.7 MG/DL (ref 0.5–1.4)
GFR SERPLBLD CREATININE-BSD FMLA CKD-EPI: >60 ML/MIN/1.73/M2
GLUCOSE SERPL-MCNC: 107 MG/DL (ref 70–110)
POTASSIUM SERPL-SCNC: 4.2 MMOL/L (ref 3.5–5.1)
SODIUM SERPL-SCNC: 138 MMOL/L (ref 136–145)

## 2025-04-07 PROCEDURE — 99999 PR PBB SHADOW E&M-EST. PATIENT-LVL IV: CPT | Mod: PBBFAC,,, | Performed by: STUDENT IN AN ORGANIZED HEALTH CARE EDUCATION/TRAINING PROGRAM

## 2025-04-07 PROCEDURE — 3008F BODY MASS INDEX DOCD: CPT | Mod: CPTII,S$GLB,, | Performed by: STUDENT IN AN ORGANIZED HEALTH CARE EDUCATION/TRAINING PROGRAM

## 2025-04-07 PROCEDURE — 80048 BASIC METABOLIC PNL TOTAL CA: CPT | Performed by: ANESTHESIOLOGY

## 2025-04-07 PROCEDURE — 1159F MED LIST DOCD IN RCRD: CPT | Mod: CPTII,S$GLB,, | Performed by: STUDENT IN AN ORGANIZED HEALTH CARE EDUCATION/TRAINING PROGRAM

## 2025-04-07 PROCEDURE — 3080F DIAST BP >= 90 MM HG: CPT | Mod: CPTII,S$GLB,, | Performed by: STUDENT IN AN ORGANIZED HEALTH CARE EDUCATION/TRAINING PROGRAM

## 2025-04-07 PROCEDURE — 3075F SYST BP GE 130 - 139MM HG: CPT | Mod: CPTII,S$GLB,, | Performed by: STUDENT IN AN ORGANIZED HEALTH CARE EDUCATION/TRAINING PROGRAM

## 2025-04-07 PROCEDURE — 99213 OFFICE O/P EST LOW 20 MIN: CPT | Mod: S$GLB,,, | Performed by: STUDENT IN AN ORGANIZED HEALTH CARE EDUCATION/TRAINING PROGRAM

## 2025-04-07 RX ORDER — SODIUM CHLORIDE, SODIUM LACTATE, POTASSIUM CHLORIDE, CALCIUM CHLORIDE 600; 310; 30; 20 MG/100ML; MG/100ML; MG/100ML; MG/100ML
INJECTION, SOLUTION INTRAVENOUS CONTINUOUS
OUTPATIENT
Start: 2025-04-07

## 2025-04-07 RX ORDER — LIDOCAINE HYDROCHLORIDE 10 MG/ML
0.5 INJECTION, SOLUTION EPIDURAL; INFILTRATION; INTRACAUDAL; PERINEURAL ONCE
OUTPATIENT
Start: 2025-04-07 | End: 2025-04-07

## 2025-04-07 NOTE — PROGRESS NOTES
"[facility]  History & Physical  Gynecology    SUBJECTIVE:     Chief Complaint/Reason for Admission: Abnormal uterine bleeding    History of Present Illness:  Patient is a 34 y.o.  who presents with heavy and irregular periods.  Has history of PCOS and hypothyroid.  Came off her OCPs this past year and cycles started skipping.  Tried prometrium without improvement.  Had continued bleeding for February and March of this year.  Heavy at points.  U/S with possible endometrial polyp.  Desires hysteroscopy D&C with possible polypectomy as well as mirena iud insertion.     (Not in a hospital admission)      Review of patient's allergies indicates:   Allergen Reactions    Latex, natural rubber Other (See Comments)     "Skin peels off"    Cucumber (cucumis sativus) Hives    Horse dander     Watermelon Hives       OB History          0    Para   0    Term   0       0    AB   0    Living   0         SAB   0    IAB   0    Ectopic   0    Multiple   0    Live Births   0               Past Medical History:   Diagnosis Date    Hypothyroid     Oligomenorrhea 01/15/2013    KEENAN (obstructive sleep apnea)     very mild, no cpap    PCOS (polycystic ovarian syndrome)      Past Surgical History:   Procedure Laterality Date    REDUCTION OF BOTH BREASTS      SCALP LESION REMOVAL W/ FLAP AND SKIN GRAFT      SINUS SURGERY      TONSILLECTOMY, ADENOIDECTOMY      TOTAL REDUCTION MAMMOPLASTY       Family History   Problem Relation Name Age of Onset    Skin cancer Father      Breast cancer Maternal Grandmother  82        Unilateral    BRCA1 Negative Maternal Grandmother      BRCA2 Negative Maternal Grandmother      Breast cancer Paternal Grandmother  48        might have been bilateral    Heart attack Paternal Grandfather  62    Breast cancer Paternal Aunt x6 64    Colon cancer Neg Hx      Ovarian cancer Neg Hx      Diabetes Neg Hx       Social History[1]      ROS:     GENERAL: Denies unintentional weight gain or weight " "loss. Feeling well overall.   SKIN: Denies rash or lesions.   HEENT: Denies headaches, or vision changes.   CARDIOVASCULAR: Denies palpitations or chest pain.   RESPIRATORY: Denies shortness of breath or dyspnea on exertion.  BREASTS: Denies pain, lumps, or nipple discharge.   ABDOMEN: Denies abdominal pain, constipation, diarrhea, nausea, vomiting, change in appetite.  URINARY: Denies frequency, dysuria, hematuria.  NEUROLOGIC: Denies syncope or weakness.   PSYCHIATRIC: Denies depression, anxiety or mood swings.    Physical Exam:      PHYSICAL EXAM:  BP (!) 138/90   Ht 5' 4" (1.626 m)   Wt 112.9 kg (249 lb 0.2 oz)   LMP 03/31/2025   BMI 42.74 kg/m²   Body mass index is 42.74 kg/m².     APPEARANCE: Well nourished, well developed, in no acute distress.  PSYCH: Appropriate mood and affect.  SKIN: No acne or hirsutism.  NECK: Neck symmetric without masses or thyromegaly.  NODES: No inguinal, axillary, or supraclavicular lymph node enlargement.  CHEST: Normal respiratory effort.    CARDIOVASCULAR:  Regular rate and rhythm.  LUNGS:  Clear to auscultation bilaterally.  BREASTS: Symmetrical, no skin changes or visible lesions.  No palpable masses or nipple discharge bilaterally.  ABDOMEN: Soft.  No tenderness or masses.    PELVIC: Normal external genitalia without lesions.  Normal hair distribution.  Adequate perineal body, normal urethral meatus.  Vagina moist and well rugated without lesions or discharge.  Cervix pink, without lesions, discharge or tenderness.  No significant cystocele or rectocele.  Bimanual exam shows uterus to be normal size, regular, mobile and nontender.  Adnexa without masses or tenderness.    EXTREMITIES: No edema.  No tenderness to palpation.  Pap (2023) nem, hpv neg  U/S 2025 uterus 7 x 4 x 2 cm with EMS 1 cm with focal thickening and increased vascularity.  R ovary 3 x 3 x 2 cm.  L ovary 3 x 1 x 3 cm.  ASSESSMENT/PLAN:     AUB    Will plan to proceed with Hysteroscopy D&C and polypectomy " and mirena iud insertion.  With the patient I had a full discussion of the risks, benefits, and alternatives of all procedures to be performed.  Discussed expectant management with serial ultrasounds and medical management.  Discussed risks of procedure including but not limited to risk of infection, postoperative pain and difficulty healing, hemorrhage requiring possible blood transfusion or need for hysterectomy, damage to surrounding tissue including but not limited to bladder, ureter, bowel, uterus, ovaries.  Also discussed possible need for laparoscopy or abdominal incision should complication occur, medical complications of surgery including but not limited to blood clot, pneumonia, prolonged hospitalization, death.  Discussed failure to resolve problem and need for further procedures depending on results of surgery.  Also reviewed recurrence of disease state.  Reviewed contraception options and additional treatment options.  Desires mirena iud insertion at time of surgery.  Risks of insertion reviewed including but not limited to pain, bleeding, cycle irregularities, infection, damage to surrounding tissue areas, need for further procedures, hysterectomy, failure of device, abnormal pregnancy.  All questions answered.    She  was also counseled on risks of blood transfusion including but not limited to transfusion reaction and infection.  She is amenable to transfusion if needed.  All of her questions were answered, and she voiced understanding. She agrees with the plan of care; therefore, we will proceed with the surgery as scheduled.  Discussed surgical plan with patient and all questions answered.             [1]   Social History  Tobacco Use    Smoking status: Never   Substance Use Topics    Alcohol use: Never     Comment: social    Drug use: Never

## 2025-04-07 NOTE — DISCHARGE INSTRUCTIONS
Information to Prepare you for your Surgery    PRE-ADMIT TESTING   [address]  [place] [address]  [address]     Your surgery has been scheduled at [facility]. We are pleased to have the opportunity to serve you. For Further Information please call [phone].    On the day of surgery please report to Registration on the 1st floor of the [facility].    CONTACT YOUR PHYSICIAN'S OFFICE THE DAY PRIOR TO YOUR SURGERY TO OBTAIN YOUR ARRIVAL TIME.     The evening before surgery do not eat anything after 9 p.m. ( this includes hard candy, chewing gum and mints).  You may only have GATORADE, POWERADE AND WATER  from 9 p.m. until you leave your home.   DO NOT DRINK ANY LIQUIDS ON THE WAY TO THE HOSPITAL.      Why does your anesthesiologist allow you to drink Gatorade/Powerade before surgery?  Gatorade/Powerade helps to increase your comfort before surgery and to decrease your nausea after surgery.   The carbohydrates in Gatorade/Powerade help reduce your body's stress response to surgery.  If you are a diabetic-drink only water prior to surgery.    Outpatient Surgery- May allow 2 adults (18 and older)/ Support Persons (1 being the designated ) for all surgical/procedural patients. A breastfeeding mother will be allowed her infant and 2 adult Support Persons. No one under the age of 18 will be allowed in the building.    MEDICATION INSTRUCTIONS: TAKE medications checked off by the Anesthesiologist on your Medication List.    Surgery Patients:  If you take ASPIRIN - Your PHYSICIAN/SURGEON will need to inform you IF/OR when you need to stop taking aspirin prior to your surgery.     Starting the week prior to surgery, do not take any medications containing IBUPROFEN or NSAIDS (Advil, Aleve, BC, Celebrex, Goody's, Ketorolac, Meloxicam, Mobic, Motrin, Naproxen, Toradol, etc).  If you are not sure if you should take a medicine please call your surgeon's office.  You may take Tylenol.    Do  Not Wear any make-up (especially eye make-up) to surgery. Please remove any false eyelashes or eyelash extensions. If you arrive the day of surgery with makeup/eyelashes on you will be required to remove prior to surgery. (There is a risk of corneal abrasions if eye makeup/eyelash extensions are not removed)    Leave all valuables at home.   Do Not wear any jewelry or watches, including any metal in body piercings. Jewelry must be removed prior to coming to the hospital.  There is a possibility that rings that are unable to be removed may be cut off if they are on the surgical extremity.    Please remove all hair extensions, wigs, clips and any other metal accessories/ ornaments from your hair.  These items may pose a flammable/fire risk in Surgery and must be removed.    Do not shave your surgical area at least 5 days prior to your surgery. The surgical prep will be performed at the hospital according to Infection Control regulations.    Contact Lens must be removed before surgery. Either do not wear the contact lens or bring a case and solution for storage.  Please bring a container for eyeglasses or dentures as required.  Bring any paperwork your physician has provided, such as consent forms,  history and physicals, doctor's orders, etc.   Bring comfortable clothes that are loose fitting to wear upon discharge. Take into consideration the type of surgery being performed.  Maintain your diet as advised per your physician the day prior to surgery.    Adequate rest the night before surgery is advised.   Park in the Parking lot behind the hospital or in the [facility] across the street from the parking lot. Parking is complimentary.  If you will be discharged the same day as your procedure, please arrange for a responsible adult to drive you home or to accompany you if traveling by taxi.   YOU WILL NOT BE PERMITTED TO DRIVE OR TO LEAVE THE HOSPITAL ALONE AFTER SURGERY.   If you are being discharged the  same day, it is strongly recommended that you arrange for someone to remain with you for the first 24 hrs following your surgery.    The Surgeon will speak to your family/visitor after your surgery regarding the outcome of your surgery and post op care.  The Surgeon may speak to you after your surgery, but there is a possibility you may not remember the details.  Please check with your family members regarding the conversation with the Surgeon.    We strongly recommend whoever is bringing you home be present for discharge instructions.  This will ensure a thorough understanding for your post op home care.              Bathing Instructions with Hibiclens  Shower the evening before and morning of your procedure with Chlorhexidine (Hibiclens)    Do not use Chlorhexidine on your face or genitals. Do not get in your eyes.  Wash your face with water and your regular face wash/soap  Use your regular shampoo  Apply Chlorhexidine (Hibiclens) directly on your skin or on a wet washcloth and wash gently. When showering: Move away from the shower stream when applying Chlorhexidine (Hibiclens) to avoid rinsing off too soon.  Rinse thoroughly with warm water  Do not dilute Chlorhexidine (Hibiclens)   Dry off as usual, do not use any deodorant, powder, body lotions, perfume, after shave or cologne.     If the patient has fever, cough, or signs/symptoms of Flu or Covid please do not come in for your surgery.   Contact your surgeon and your primary care physician for further instructions.   Please also call [facility] Outpatient Surgery [phone]. The unit opens at 5 AM.    If applicable, please bring your blood pressure & diabetes medications the day of surgery.

## 2025-04-12 NOTE — PROGRESS NOTES
Chief Complaint: U/S Results     HPI:      Roxanna Banuelos is a 34 y.o.  who presents today for follow up of U/S results.  LMP: Patient's last menstrual period was 2025..  Came off ocps last ear.  Cycles were irregular.  Would have long periods of bleeding.  Heavy bleeding as well.  Tried prometrium to help regulate but irregularities have persisted.  No other issues, problems, or complaints.     OB History          0    Para   0    Term   0       0    AB   0    Living   0         SAB   0    IAB   0    Ectopic   0    Multiple   0    Live Births   0               Past Medical History:   Diagnosis Date    Hypothyroid     Oligomenorrhea 01/15/2013    KEENAN (obstructive sleep apnea)     very mild, no cpap    PCOS (polycystic ovarian syndrome)      Past Surgical History:   Procedure Laterality Date    REDUCTION OF BOTH BREASTS      SCALP LESION REMOVAL W/ FLAP AND SKIN GRAFT      SINUS SURGERY      TONSILLECTOMY, ADENOIDECTOMY      TOTAL REDUCTION MAMMOPLASTY       Social History[1]  Family History   Problem Relation Name Age of Onset    Skin cancer Father      Breast cancer Maternal Grandmother  82        Unilateral    BRCA1 Negative Maternal Grandmother      BRCA2 Negative Maternal Grandmother      Breast cancer Paternal Grandmother  48        might have been bilateral    Heart attack Paternal Grandfather  62    Breast cancer Paternal Aunt x6 64    Colon cancer Neg Hx      Ovarian cancer Neg Hx      Diabetes Neg Hx       Current Medications[2]    ROS:     GENERAL: Denies unintentional weight gain or weight loss. Feeling well overall.   SKIN: Denies rash or lesions.   HEENT: Denies headaches, or vision changes.   ABDOMEN: Denies abdominal pain, constipation, diarrhea, nausea, vomiting, change in appetite.  URINARY: Denies frequency, dysuria, hematuria.  NEUROLOGIC: Denies syncope or weakness.   PSYCHIATRIC: Denies depression, anxiety or mood swings.    Physical Exam:      PHYSICAL EXAM:  BP  "124/78   Ht 5' 4" (1.626 m)   Wt 114 kg (251 lb 5.2 oz)   LMP 2025   BMI 43.14 kg/m²   Body mass index is 43.14 kg/m².     APPEARANCE: Well nourished, well developed, in no acute distress.  PSYCH: Appropriate mood and affect.   et genitalia normal in appearance, vagina with blood, cervix without lesion, no active bleeding, uterus mobile and nontender  SKIN: No acne or hirsutism.      Assessment/Plan:     34 y.o.     Abnormal uterine bleeding (AUB)    Other orders  -     norgestimate-ethinyl estradioL (ORTHO-CYCLEN) 0.25-35 mg-mcg per tablet; Take 1 tablet by mouth three times daily x 7 days, then 1 tablet by mouth two times daily x 7 days, then daily.  Dispense: 90 tablet; Refill: 1          Counselin.  AUB:  U/S results reviewed with patient.  Labs ordered.  Will go ahead and restart ocp cascade to help with bleeding right now.  U/S with possible endometrial polyp.  Discussed hysteroscopy d&C, in office sampling with emb, sis.  Desires hysteroscopy D&C.  Also reviewed further management options for irregular bleeding - she is interested in Mirena iud insertion.  Will set up for pre op.  All questions answered.  Also   2.  Follow up with PCP for other health maintenance.  3.  RTC for pre op    Use of the [facility] Patient Portal discussed and encouraged during today's visit.                    [1]   Social History  Socioeconomic History    Marital status: Single   Tobacco Use    Smoking status: Never   Substance and Sexual Activity    Alcohol use: Never     Comment: social    Drug use: Never    Sexual activity: Never   Social History Narrative    Senior at [employer].     Social Drivers of Health     Financial Resource Strain: Patient Declined (2024)    [place] [facility]    Overall Financial Resource Strain (CARDIA)     Difficulty of Paying Living Expenses: Patient declined   Food Insecurity: Patient Declined (2024)    [place] [facility]    Hunger Vital Sign     Worried " About Running Out of Food in the Last Year: Patient declined     Ran Out of Food in the Last Year: Patient declined   Transportation Needs: Patient Declined (6/27/2024)    Received from [facility]    PRAPARE - Transportation     Lack of Transportation (Medical): Patient declined     Lack of Transportation (Non-Medical): Patient declined   Physical Activity: Insufficiently Active (6/27/2024)    Received from [facility]    Exercise Vital Sign     Days of Exercise per Week: 3 days     Minutes of Exercise per Session: 20 min   Stress: No Stress Concern Present (6/27/2024)    Received from [facility]    [place] [employer] - Occupational Stress Questionnaire     Feeling of Stress : Only a little   Housing Stability: Low Risk  (2/11/2024)    Housing Stability Vital Sign     Unable to Pay for Housing in the Last Year: No     Number of Places Lived in the Last Year: 1     Unstable Housing in the Last Year: No   [2]   Current Outpatient Medications:     busPIRone (BUSPAR) 15 MG tablet, Take 15 mg by mouth 2 (two) times daily., Disp: , Rfl:     clonazePAM (KLONOPIN) 0.5 MG tablet, TAKE 0.5 -1 TABLET BY MOUTH TWICE DAILY AS NEEDED, Disp: , Rfl:     minoxidiL (LONITEN) 2.5 MG tablet, Take 2.5 mg by mouth. (Patient not taking: Reported on 4/7/2025), Disp: , Rfl:     multivitamin capsule, Take 1 capsule by mouth once daily., Disp: , Rfl:     ondansetron (ZOFRAN-ODT) 8 MG TbDL, DISSOLVE 1 TABLET ON THE TONGUE EVERY 8 HOURS AS NEEDED (Patient not taking: Reported on 4/7/2025), Disp: , Rfl:     spironolactone (ALDACTONE) 100 MG tablet, Take 200 mg by mouth. (Patient not taking: Reported on 4/7/2025), Disp: , Rfl:     TIROSINT 50 mcg Cap, TAKE ONE Capsule BY MOUTH EVERY DAY (Patient not taking: Reported on 4/7/2025), Disp: 90 capsule, Rfl: 0    tirzepatide, weight loss, (ZEPBOUND) 2.5 mg/0.5 mL Soln, Inject 2.5 mg into the skin. (Patient not taking: Reported on 4/7/2025), Disp: , Rfl:     traZODone (DESYREL)  50 MG tablet, Take 75 mg by mouth nightly as needed., Disp: , Rfl:     ferrous sulfate (IRON ORAL), Take by mouth once daily., Disp: , Rfl:     ibuprofen (ADVIL ORAL), Take by mouth as needed. (Patient not taking: Reported on 4/7/2025), Disp: , Rfl:     ibuprofen (MOTRIN ORAL), Take by mouth as needed. (Patient not taking: Reported on 4/7/2025), Disp: , Rfl:     nitrofurantoin monohyd/m-cryst (MACROBID ORAL), Take by mouth once daily. For UTI (Patient not taking: Reported on 4/7/2025), Disp: , Rfl:     norgestimate-ethinyl estradioL (ORTHO-CYCLEN) 0.25-35 mg-mcg per tablet, Take 1 tablet by mouth three times daily x 7 days, then 1 tablet by mouth two times daily x 7 days, then daily., Disp: 90 tablet, Rfl: 1    phenazopyridine HCl (PYRIDIUM ORAL), Take by mouth as needed., Disp: , Rfl:     UNABLE TO FIND, Take by mouth once daily. Thyrosol, Disp: , Rfl:

## 2025-04-13 RX ORDER — DEXTROSE, SODIUM CHLORIDE, SODIUM LACTATE, POTASSIUM CHLORIDE, AND CALCIUM CHLORIDE 5; .6; .31; .03; .02 G/100ML; G/100ML; G/100ML; G/100ML; G/100ML
INJECTION, SOLUTION INTRAVENOUS CONTINUOUS
Status: CANCELLED | OUTPATIENT
Start: 2025-04-13

## 2025-04-13 RX ORDER — FAMOTIDINE 20 MG/1
20 TABLET, FILM COATED ORAL
Status: CANCELLED | OUTPATIENT
Start: 2025-04-13

## 2025-04-13 NOTE — H&P
"[facility]  History & Physical  Gynecology     SUBJECTIVE:      Chief Complaint/Reason for Admission: Abnormal uterine bleeding     History of Present Illness:  Patient is a 34 y.o.  who presents with heavy and irregular periods.  Has history of PCOS and hypothyroid.  Came off her OCPs this past year and cycles started skipping.  Tried prometrium without improvement.  Had continued bleeding for February and March of this year.  Heavy at points.  U/S with possible endometrial polyp.  Desires hysteroscopy D&C with possible polypectomy as well as mirena iud insertion.      (Not in a hospital admission)              Review of patient's allergies indicates:   Allergen Reactions    Latex, natural rubber Other (See Comments)       "Skin peels off"    Cucumber (cucumis sativus) Hives    Horse dander      Watermelon Hives         OB History            0    Para   0    Term   0       0    AB   0    Living   0           SAB   0    IAB   0    Ectopic   0    Multiple   0    Live Births   0                       Past Medical History:   Diagnosis Date    Hypothyroid      Oligomenorrhea 01/15/2013    KEENAN (obstructive sleep apnea)       very mild, no cpap    PCOS (polycystic ovarian syndrome)              Past Surgical History:   Procedure Laterality Date    REDUCTION OF BOTH BREASTS        SCALP LESION REMOVAL W/ FLAP AND SKIN GRAFT        SINUS SURGERY        TONSILLECTOMY, ADENOIDECTOMY        TOTAL REDUCTION MAMMOPLASTY                 Family History   Problem Relation Name Age of Onset    Skin cancer Father        Breast cancer Maternal Grandmother   82         Unilateral    BRCA1 Negative Maternal Grandmother        BRCA2 Negative Maternal Grandmother        Breast cancer Paternal Grandmother   48         might have been bilateral    Heart attack Paternal Grandfather   62    Breast cancer Paternal Aunt x6 64    Colon cancer Neg Hx        Ovarian cancer Neg Hx        Diabetes Neg Hx          [Social " "History]    [Social History]            Tobacco Use    Smoking status: Never   Substance Use Topics    Alcohol use: Never       Comment: social    Drug use: Never           ROS:      GENERAL: Denies unintentional weight gain or weight loss. Feeling well overall.   SKIN: Denies rash or lesions.   HEENT: Denies headaches, or vision changes.   CARDIOVASCULAR: Denies palpitations or chest pain.   RESPIRATORY: Denies shortness of breath or dyspnea on exertion.  BREASTS: Denies pain, lumps, or nipple discharge.   ABDOMEN: Denies abdominal pain, constipation, diarrhea, nausea, vomiting, change in appetite.  URINARY: Denies frequency, dysuria, hematuria.  NEUROLOGIC: Denies syncope or weakness.   PSYCHIATRIC: Denies depression, anxiety or mood swings.     Physical Exam:      PHYSICAL EXAM:  BP (!) 138/90   Ht 5' 4" (1.626 m)   Wt 112.9 kg (249 lb 0.2 oz)   LMP 03/31/2025   BMI 42.74 kg/m²   Body mass index is 42.74 kg/m².      APPEARANCE: Well nourished, well developed, in no acute distress.  PSYCH: Appropriate mood and affect.  SKIN: No acne or hirsutism.  NECK: Neck symmetric without masses or thyromegaly.  NODES: No inguinal, axillary, or supraclavicular lymph node enlargement.  CHEST: Normal respiratory effort.    CARDIOVASCULAR:  Regular rate and rhythm.  LUNGS:  Clear to auscultation bilaterally.  BREASTS: Symmetrical, no skin changes or visible lesions.  No palpable masses or nipple discharge bilaterally.  ABDOMEN: Soft.  No tenderness or masses.    PELVIC: Normal external genitalia without lesions.  Normal hair distribution.  Adequate perineal body, normal urethral meatus.  Vagina moist and well rugated without lesions or discharge.  Cervix pink, without lesions, discharge or tenderness.  No significant cystocele or rectocele.  Bimanual exam shows uterus to be normal size, regular, mobile and nontender.  Adnexa without masses or tenderness.    EXTREMITIES: No edema.  No tenderness to palpation.  Pap (2023) nem, " hpv neg  U/S 2025 uterus 7 x 4 x 2 cm with EMS 1 cm with focal thickening and increased vascularity.  R ovary 3 x 3 x 2 cm.  L ovary 3 x 1 x 3 cm.  ASSESSMENT/PLAN:      AUB     Will plan to proceed with Hysteroscopy D&C and polypectomy and mirena iud insertion.  With the patient I had a full discussion of the risks, benefits, and alternatives of all procedures to be performed.  Discussed expectant management with serial ultrasounds and medical management.  Discussed risks of procedure including but not limited to risk of infection, postoperative pain and difficulty healing, hemorrhage requiring possible blood transfusion or need for hysterectomy, damage to surrounding tissue including but not limited to bladder, ureter, bowel, uterus, ovaries.  Also discussed possible need for laparoscopy or abdominal incision should complication occur, medical complications of surgery including but not limited to blood clot, pneumonia, prolonged hospitalization, death.  Discussed failure to resolve problem and need for further procedures depending on results of surgery.  Also reviewed recurrence of disease state.  Reviewed contraception options and additional treatment options.  Desires mirena iud insertion at time of surgery.  Risks of insertion reviewed including but not limited to pain, bleeding, cycle irregularities, infection, damage to surrounding tissue areas, need for further procedures, hysterectomy, failure of device, abnormal pregnancy.  All questions answered.    She  was also counseled on risks of blood transfusion including but not limited to transfusion reaction and infection.  She is amenable to transfusion if needed.  All of her questions were answered, and she voiced understanding. She agrees with the plan of care; therefore, we will proceed with the surgery as scheduled.  Discussed surgical plan with patient and all questions answered.

## 2025-04-14 ENCOUNTER — TELEPHONE (OUTPATIENT)
Dept: OBSTETRICS AND GYNECOLOGY | Facility: CLINIC | Age: 35
End: 2025-04-14
Payer: COMMERCIAL

## 2025-04-14 ENCOUNTER — PATIENT MESSAGE (OUTPATIENT)
Dept: OBSTETRICS AND GYNECOLOGY | Facility: CLINIC | Age: 35
End: 2025-04-14
Payer: COMMERCIAL

## 2025-04-15 ENCOUNTER — HOSPITAL ENCOUNTER (OUTPATIENT)
Facility: OTHER | Age: 35
Discharge: HOME | End: 2025-04-15
Attending: STUDENT IN AN ORGANIZED HEALTH CARE EDUCATION/TRAINING PROGRAM | Admitting: STUDENT IN AN ORGANIZED HEALTH CARE EDUCATION/TRAINING PROGRAM
Payer: COMMERCIAL

## 2025-04-15 ENCOUNTER — ANESTHESIA (OUTPATIENT)
Dept: SURGERY | Facility: OTHER | Age: 35
End: 2025-04-15
Payer: COMMERCIAL

## 2025-04-15 ENCOUNTER — TELEPHONE (OUTPATIENT)
Dept: OBSTETRICS AND GYNECOLOGY | Facility: HOSPITAL | Age: 35
End: 2025-04-15
Payer: COMMERCIAL

## 2025-04-15 VITALS
RESPIRATION RATE: 18 BRPM | SYSTOLIC BLOOD PRESSURE: 160 MMHG | TEMPERATURE: 99 F | HEART RATE: 79 BPM | OXYGEN SATURATION: 99 % | DIASTOLIC BLOOD PRESSURE: 77 MMHG

## 2025-04-15 DIAGNOSIS — N93.9 ABNORMAL UTERINE BLEEDING (AUB): Primary | ICD-10-CM

## 2025-04-15 DIAGNOSIS — N93.9 ABNORMAL UTERINE BLEEDING (AUB): ICD-10-CM

## 2025-04-15 LAB
ABORH RETYPE: NORMAL
ABSOLUTE EOSINOPHIL (OHS): 0.22 K/UL
ABSOLUTE MONOCYTE (OHS): 0.64 K/UL (ref 0.3–1)
ABSOLUTE NEUTROPHIL COUNT (OHS): 4.38 K/UL (ref 1.8–7.7)
B-HCG UR QL: NEGATIVE
BASOPHILS # BLD AUTO: 0.05 K/UL
BASOPHILS NFR BLD AUTO: 0.7 %
CTP QC/QA: YES
ERYTHROCYTE [DISTWIDTH] IN BLOOD BY AUTOMATED COUNT: 14.9 % (ref 11.5–14.5)
HCT VFR BLD AUTO: 35.3 % (ref 37–48.5)
HGB BLD-MCNC: 10.9 GM/DL (ref 12–16)
IMM GRANULOCYTES # BLD AUTO: 0.02 K/UL (ref 0–0.04)
IMM GRANULOCYTES NFR BLD AUTO: 0.3 % (ref 0–0.5)
INDIRECT COOMBS: NORMAL
LYMPHOCYTES # BLD AUTO: 2.31 K/UL (ref 1–4.8)
MCH RBC QN AUTO: 26.3 PG (ref 27–31)
MCHC RBC AUTO-ENTMCNC: 30.9 G/DL (ref 32–36)
MCV RBC AUTO: 85 FL (ref 82–98)
NUCLEATED RBC (/100WBC) (OHS): 0 /100 WBC
PLATELET # BLD AUTO: 560 K/UL (ref 150–450)
PMV BLD AUTO: 9 FL (ref 9.2–12.9)
RBC # BLD AUTO: 4.15 M/UL (ref 4–5.4)
RELATIVE EOSINOPHIL (OHS): 2.9 %
RELATIVE LYMPHOCYTE (OHS): 30.3 % (ref 18–48)
RELATIVE MONOCYTE (OHS): 8.4 % (ref 4–15)
RELATIVE NEUTROPHIL (OHS): 57.4 % (ref 38–73)
RH BLD: NORMAL
SPECIMEN OUTDATE: NORMAL
WBC # BLD AUTO: 7.62 K/UL (ref 3.9–12.7)

## 2025-04-15 PROCEDURE — 36000706: Performed by: STUDENT IN AN ORGANIZED HEALTH CARE EDUCATION/TRAINING PROGRAM

## 2025-04-15 PROCEDURE — 63600175 PHARM REV CODE 636 W HCPCS: Performed by: STUDENT IN AN ORGANIZED HEALTH CARE EDUCATION/TRAINING PROGRAM

## 2025-04-15 PROCEDURE — 27201423 OPTIME MED/SURG SUP & DEVICES STERILE SUPPLY: Performed by: STUDENT IN AN ORGANIZED HEALTH CARE EDUCATION/TRAINING PROGRAM

## 2025-04-15 PROCEDURE — 85025 COMPLETE CBC W/AUTO DIFF WBC: CPT | Performed by: STUDENT IN AN ORGANIZED HEALTH CARE EDUCATION/TRAINING PROGRAM

## 2025-04-15 PROCEDURE — 88305 TISSUE EXAM BY PATHOLOGIST: CPT | Mod: 26,,, | Performed by: PATHOLOGY

## 2025-04-15 PROCEDURE — 71000015 HC POSTOP RECOV 1ST HR: Performed by: STUDENT IN AN ORGANIZED HEALTH CARE EDUCATION/TRAINING PROGRAM

## 2025-04-15 PROCEDURE — 37000009 HC ANESTHESIA EA ADD 15 MINS: Performed by: STUDENT IN AN ORGANIZED HEALTH CARE EDUCATION/TRAINING PROGRAM

## 2025-04-15 PROCEDURE — 36415 COLL VENOUS BLD VENIPUNCTURE: CPT | Performed by: STUDENT IN AN ORGANIZED HEALTH CARE EDUCATION/TRAINING PROGRAM

## 2025-04-15 PROCEDURE — 86850 RBC ANTIBODY SCREEN: CPT | Performed by: STUDENT IN AN ORGANIZED HEALTH CARE EDUCATION/TRAINING PROGRAM

## 2025-04-15 PROCEDURE — 58300 INSERT INTRAUTERINE DEVICE: CPT | Mod: 51,,, | Performed by: STUDENT IN AN ORGANIZED HEALTH CARE EDUCATION/TRAINING PROGRAM

## 2025-04-15 PROCEDURE — 25000003 PHARM REV CODE 250: Performed by: STUDENT IN AN ORGANIZED HEALTH CARE EDUCATION/TRAINING PROGRAM

## 2025-04-15 PROCEDURE — 36000707: Performed by: STUDENT IN AN ORGANIZED HEALTH CARE EDUCATION/TRAINING PROGRAM

## 2025-04-15 PROCEDURE — 37000008 HC ANESTHESIA 1ST 15 MINUTES: Performed by: STUDENT IN AN ORGANIZED HEALTH CARE EDUCATION/TRAINING PROGRAM

## 2025-04-15 PROCEDURE — 71000033 HC RECOVERY, INTIAL HOUR: Performed by: STUDENT IN AN ORGANIZED HEALTH CARE EDUCATION/TRAINING PROGRAM

## 2025-04-15 PROCEDURE — 25000003 PHARM REV CODE 250: Performed by: ANESTHESIOLOGY

## 2025-04-15 PROCEDURE — 88305 TISSUE EXAM BY PATHOLOGIST: CPT | Mod: TC,91 | Performed by: STUDENT IN AN ORGANIZED HEALTH CARE EDUCATION/TRAINING PROGRAM

## 2025-04-15 PROCEDURE — 81025 URINE PREGNANCY TEST: CPT | Performed by: STUDENT IN AN ORGANIZED HEALTH CARE EDUCATION/TRAINING PROGRAM

## 2025-04-15 PROCEDURE — 58558 HYSTEROSCOPY BIOPSY: CPT | Mod: ,,, | Performed by: STUDENT IN AN ORGANIZED HEALTH CARE EDUCATION/TRAINING PROGRAM

## 2025-04-15 PROCEDURE — 71000016 HC POSTOP RECOV ADDL HR: Performed by: STUDENT IN AN ORGANIZED HEALTH CARE EDUCATION/TRAINING PROGRAM

## 2025-04-15 RX ORDER — SODIUM CHLORIDE 9 MG/ML
INJECTION, SOLUTION INTRAVENOUS CONTINUOUS
Status: DISCONTINUED | OUTPATIENT
Start: 2025-04-15 | End: 2025-04-15 | Stop reason: HOSPADM

## 2025-04-15 RX ORDER — OXYCODONE AND ACETAMINOPHEN 5; 325 MG/1; MG/1
1 TABLET ORAL EVERY 4 HOURS PRN
Qty: 20 TABLET | Refills: 0 | Status: SHIPPED | OUTPATIENT
Start: 2025-04-15

## 2025-04-15 RX ORDER — DEXTROSE, SODIUM CHLORIDE, SODIUM LACTATE, POTASSIUM CHLORIDE, AND CALCIUM CHLORIDE 5; .6; .31; .03; .02 G/100ML; G/100ML; G/100ML; G/100ML; G/100ML
INJECTION, SOLUTION INTRAVENOUS CONTINUOUS
Status: DISCONTINUED | OUTPATIENT
Start: 2025-04-15 | End: 2025-04-15 | Stop reason: HOSPADM

## 2025-04-15 RX ORDER — ONDANSETRON HYDROCHLORIDE 2 MG/ML
INJECTION, SOLUTION INTRAVENOUS
Status: DISCONTINUED | OUTPATIENT
Start: 2025-04-15 | End: 2025-04-15

## 2025-04-15 RX ORDER — LIDOCAINE HYDROCHLORIDE 20 MG/ML
INJECTION INTRAVENOUS
Status: DISCONTINUED | OUTPATIENT
Start: 2025-04-15 | End: 2025-04-15

## 2025-04-15 RX ORDER — SODIUM CHLORIDE 0.9 % (FLUSH) 0.9 %
3 SYRINGE (ML) INJECTION
Status: DISCONTINUED | OUTPATIENT
Start: 2025-04-15 | End: 2025-04-15 | Stop reason: HOSPADM

## 2025-04-15 RX ORDER — DEXAMETHASONE SODIUM PHOSPHATE 4 MG/ML
INJECTION, SOLUTION INTRA-ARTICULAR; INTRALESIONAL; INTRAMUSCULAR; INTRAVENOUS; SOFT TISSUE
Status: DISCONTINUED | OUTPATIENT
Start: 2025-04-15 | End: 2025-04-15

## 2025-04-15 RX ORDER — PROCHLORPERAZINE EDISYLATE 5 MG/ML
5 INJECTION INTRAMUSCULAR; INTRAVENOUS EVERY 30 MIN PRN
Status: DISCONTINUED | OUTPATIENT
Start: 2025-04-15 | End: 2025-04-15 | Stop reason: HOSPADM

## 2025-04-15 RX ORDER — FENTANYL CITRATE 50 UG/ML
INJECTION, SOLUTION INTRAMUSCULAR; INTRAVENOUS
Status: DISCONTINUED | OUTPATIENT
Start: 2025-04-15 | End: 2025-04-15

## 2025-04-15 RX ORDER — NORGESTIMATE AND ETHINYL ESTRADIOL 0.25-0.035
1 KIT ORAL DAILY
Qty: 28 TABLET | Refills: 11 | Status: SHIPPED | OUTPATIENT
Start: 2025-04-15 | End: 2026-04-15

## 2025-04-15 RX ORDER — ACETAMINOPHEN 500 MG
1000 TABLET ORAL
Status: COMPLETED | OUTPATIENT
Start: 2025-04-15 | End: 2025-04-15

## 2025-04-15 RX ORDER — OXYCODONE HYDROCHLORIDE 5 MG/1
5 TABLET ORAL EVERY 4 HOURS PRN
Status: DISCONTINUED | OUTPATIENT
Start: 2025-04-15 | End: 2025-04-15 | Stop reason: HOSPADM

## 2025-04-15 RX ORDER — MUPIROCIN 20 MG/G
OINTMENT TOPICAL
Status: CANCELLED | OUTPATIENT
Start: 2025-04-15

## 2025-04-15 RX ORDER — KETOROLAC TROMETHAMINE 30 MG/ML
INJECTION, SOLUTION INTRAMUSCULAR; INTRAVENOUS
Status: DISCONTINUED | OUTPATIENT
Start: 2025-04-15 | End: 2025-04-15

## 2025-04-15 RX ORDER — PROPOFOL 10 MG/ML
VIAL (ML) INTRAVENOUS
Status: DISCONTINUED | OUTPATIENT
Start: 2025-04-15 | End: 2025-04-15

## 2025-04-15 RX ORDER — GLUCAGON 1 MG
1 KIT INJECTION
Status: DISCONTINUED | OUTPATIENT
Start: 2025-04-15 | End: 2025-04-15 | Stop reason: HOSPADM

## 2025-04-15 RX ORDER — SODIUM CHLORIDE, SODIUM LACTATE, POTASSIUM CHLORIDE, CALCIUM CHLORIDE 600; 310; 30; 20 MG/100ML; MG/100ML; MG/100ML; MG/100ML
INJECTION, SOLUTION INTRAVENOUS CONTINUOUS
Status: DISCONTINUED | OUTPATIENT
Start: 2025-04-15 | End: 2025-04-15 | Stop reason: HOSPADM

## 2025-04-15 RX ORDER — HYDROMORPHONE HYDROCHLORIDE 2 MG/ML
0.4 INJECTION, SOLUTION INTRAMUSCULAR; INTRAVENOUS; SUBCUTANEOUS EVERY 5 MIN PRN
Status: DISCONTINUED | OUTPATIENT
Start: 2025-04-15 | End: 2025-04-15 | Stop reason: HOSPADM

## 2025-04-15 RX ORDER — FAMOTIDINE 20 MG/1
20 TABLET, FILM COATED ORAL
Status: COMPLETED | OUTPATIENT
Start: 2025-04-15 | End: 2025-04-15

## 2025-04-15 RX ORDER — SODIUM CHLORIDE 9 MG/ML
INJECTION, SOLUTION INTRAVENOUS CONTINUOUS
Status: CANCELLED | OUTPATIENT
Start: 2025-04-15

## 2025-04-15 RX ORDER — LIDOCAINE HYDROCHLORIDE 10 MG/ML
0.5 INJECTION, SOLUTION EPIDURAL; INFILTRATION; INTRACAUDAL; PERINEURAL ONCE
Status: DISCONTINUED | OUTPATIENT
Start: 2025-04-15 | End: 2025-04-15 | Stop reason: HOSPADM

## 2025-04-15 RX ORDER — IBUPROFEN 800 MG/1
800 TABLET ORAL 3 TIMES DAILY
Qty: 30 TABLET | Refills: 0 | Status: SHIPPED | OUTPATIENT
Start: 2025-04-15

## 2025-04-15 RX ORDER — MUPIROCIN 20 MG/G
OINTMENT TOPICAL
Status: DISCONTINUED | OUTPATIENT
Start: 2025-04-15 | End: 2025-04-15 | Stop reason: HOSPADM

## 2025-04-15 RX ORDER — DIPHENHYDRAMINE HYDROCHLORIDE 50 MG/ML
12.5 INJECTION, SOLUTION INTRAMUSCULAR; INTRAVENOUS EVERY 30 MIN PRN
Status: DISCONTINUED | OUTPATIENT
Start: 2025-04-15 | End: 2025-04-15 | Stop reason: HOSPADM

## 2025-04-15 RX ORDER — ONDANSETRON 4 MG/1
4 TABLET, FILM COATED ORAL EVERY 6 HOURS PRN
Qty: 20 TABLET | Refills: 0 | Status: SHIPPED | OUTPATIENT
Start: 2025-04-15

## 2025-04-15 RX ADMIN — DEXAMETHASONE SODIUM PHOSPHATE 8 MG: 4 INJECTION, SOLUTION INTRAMUSCULAR; INTRAVENOUS at 03:04

## 2025-04-15 RX ADMIN — OXYCODONE HYDROCHLORIDE 5 MG: 5 TABLET ORAL at 04:04

## 2025-04-15 RX ADMIN — KETOROLAC TROMETHAMINE 30 MG: 30 INJECTION, SOLUTION INTRAMUSCULAR; INTRAVENOUS at 03:04

## 2025-04-15 RX ADMIN — PROPOFOL 200 MG: 10 INJECTION, EMULSION INTRAVENOUS at 03:04

## 2025-04-15 RX ADMIN — FENTANYL CITRATE 100 MCG: 50 INJECTION, SOLUTION INTRAMUSCULAR; INTRAVENOUS at 03:04

## 2025-04-15 RX ADMIN — SODIUM CHLORIDE: 0.9 INJECTION, SOLUTION INTRAVENOUS at 03:04

## 2025-04-15 RX ADMIN — LEVONORGESTREL 1 INTRA UTERINE DEVICE: 52 INTRAUTERINE DEVICE INTRAUTERINE at 04:04

## 2025-04-15 RX ADMIN — ACETAMINOPHEN 1000 MG: 500 TABLET ORAL at 11:04

## 2025-04-15 RX ADMIN — ONDANSETRON HYDROCHLORIDE 4 MG: 2 INJECTION INTRAMUSCULAR; INTRAVENOUS at 03:04

## 2025-04-15 RX ADMIN — LIDOCAINE HYDROCHLORIDE 80 MG: 20 INJECTION, SOLUTION INTRAVENOUS at 03:04

## 2025-04-15 RX ADMIN — FAMOTIDINE 20 MG: 20 TABLET, FILM COATED ORAL at 11:04

## 2025-04-15 RX ADMIN — PROPOFOL 50 MG: 10 INJECTION, EMULSION INTRAVENOUS at 03:04

## 2025-04-15 NOTE — ANESTHESIA PROCEDURE NOTES
Intubation    Date/Time: 4/15/2025 3:22 PM    Performed by: Andrade Mancera CRNA  Authorized by: Amado Dorman MD    Intubation:     Induction:  Intravenous    Intubated:  Postinduction    Mask Ventilation:  Not attempted    Attempts:  1    Attempted By:  CRNA    Difficult Airway Encountered?: No      Complications:  None    Airway Device:  Supraglottic airway/LMA    Airway Device Size:  4.0    Secured at:  The lips    Placement Verified By:  Capnometry    Complicating Factors:  None    Findings Post-Intubation:  BS equal bilateral and atraumatic/condition of teeth unchanged

## 2025-04-15 NOTE — BRIEF OP NOTE
BRIEF OPERATIVE NOTE       SUMMARY      Surgery Date: 04/15/2025     SURGEON: Jessica Huddleston    ASSISTANT: None    PREOP DIAGNOSIS: aub    POSTOP DIAGNOSIS:  aub    PROCEDURES: hysteroscopy D&C, mirena iud insertion    ANESTHESIA: epidural    FINDINGS/KEY COMPONENTS: normal appearing endometrial cavity, small vaginal laceration repaired with 2-0 vicryl    ESTIMATED BLOOD LOSS: 10 mL    COMPLICATIONS: none    PATHOLOGY:   Specimens (From admission, onward)       Start     Ordered    04/15/25 2404  Specimen to Pathology Gynecology and Obstetrics  RELEASE UPON ORDERING        References:    Click here for ordering Quick Tip   Question:  Release to patient  Answer:  Immediate    04/15/25 4636

## 2025-04-15 NOTE — PLAN OF CARE
Roxanna Banuelos has met all discharge criteria from Phase II. Vital Signs are stable, ambulating  without difficulty. Discharge instructions given, patient verbalized understanding. Discharged from facility via wheelchair in stable condition.

## 2025-04-15 NOTE — ANESTHESIA POSTPROCEDURE EVALUATION
Anesthesia Post Evaluation    Patient: Roxanna Banuelos    Procedure(s) Performed: Procedure(s) (LRB):  HYSTEROSCOPY, WITH DILATION AND CURETTAGE OF UTERUS (N/A)  INSERTION, INTRAUTERINE DEVICE (N/A)    Final Anesthesia Type: general      Patient location during evaluation: PACU  Patient participation: Yes- Able to Participate  Level of consciousness: awake and alert  Post-procedure vital signs: reviewed and stable  Pain management: adequate  Airway patency: patent    PONV status at discharge: No PONV  Anesthetic complications: no      Cardiovascular status: blood pressure returned to baseline  Respiratory status: spontaneous ventilation  Hydration status: euvolemic  Follow-up not needed.          Vitals Value Taken Time   /78 04/15/25 16:31   Temp 36.1 °C (97 °F) 04/15/25 16:03   Pulse 71 04/15/25 16:31   Resp 18 04/15/25 16:20   SpO2 99 % 04/15/25 16:31   Vitals shown include unfiled device data.      No case tracking events are documented in the log.      Pain/Florinda Score: Pain Rating Prior to Med Admin: 4 (4/15/2025  4:20 PM)  Florinda Score: 8 (4/15/2025  4:03 PM)

## 2025-04-15 NOTE — TRANSFER OF CARE
Anesthesia Transfer of Care Note    Patient: Roxanna Banuelos    Procedure(s) Performed: Procedure(s) (LRB):  HYSTEROSCOPY, WITH DILATION AND CURETTAGE OF UTERUS (N/A)  INSERTION, INTRAUTERINE DEVICE (N/A)    Patient location: PACU    Anesthesia Type: general    Transport from OR: Transported from OR on room air with adequate spontaneous ventilation    Post pain: adequate analgesia    Post assessment: no apparent anesthetic complications and tolerated procedure well    Post vital signs: stable    Level of consciousness: awake and responds to stimulation    Nausea/Vomiting: no nausea/vomiting    Complications: none    Transfer of care protocol was followed      Last vitals: Visit Vitals  BP (!) 129/94 (BP Location: Right arm)   Pulse 74   Temp 36.7 °C (98 °F) (Temporal)   Resp 18   LMP 03/16/2025   SpO2 95%   Breastfeeding No

## 2025-04-15 NOTE — CHAPLAIN
04/15/25 1537   Clinical Encounter Type   Visit Type Initial Visit   Visit Category Pre-Op   Visited With Patient and family together   Number of Family Visited 2   Length of Visit 30 Minutes   Confucianism Encounters   Spiritual Resources Requested Prayer   Patient Spiritual Encounters   Care Provided Reflective listening;Compassionate presence;Prayer support;Explored pt/family concerns   Patient Coping Anxiety;Fearful;Open/discussion   Comments - Patient patient states she is feeling anxious prior to her procedure. I offered a comforting, non anxious presence and prayer as requested   Family Spiritual Encounters   Care Provided Reflective listening;Compassionate presence;Prayer support   Family Coping Open/discussion

## 2025-04-15 NOTE — DISCHARGE INSTRUCTIONS
Home Care Instruction D&C Hysteroscopy             ACTIVITY LEVEL:  If you received sedation and/or an anesthetic, you may feel sleepy for several hours. Rest until you feel more  awake. Gradually resume your normal activities.    DIET:  At home, continue with liquids. If there is no nausea, you may eat a soft diet and gradually resume a regular diet.    BATHING:  You may shower  as desired in one day.  You should avoid tub baths, hot tubs and swimming pools until seen by your physician for a post-op follow up.    CARE:  You can expect watery or bloody vaginal discharge for several days. Do not use tampons, please only use pads. Sexual activity is restricted as advised by your doctor.    MEDICATIONS:  You will receive instructions for any pain and/or antibiotic prescriptions. Pain medication should be taken only if needed and as directed. Antibiotics, if ordered, should be taken as directed until the entire prescription is completed.    ADDITIONAL INFORMATION:  __________________________________________________________________________________________  WHEN TO CALL THE DOCTOR:   For any heavy vaginal bleeding   Fever over 101°F (38.4°C)   Any lower abdominal pain not relieved by the pain mediation  RETURN APPOINTMENT:  __________________________________________________________________________________________  FOR EMERGENCIES:  If any unusual problems or difficulties occur, contact  __________________ or the resident at [phone] (page ) or the Clinic office, [phone].  
09-Nov-2021 09:27

## 2025-04-15 NOTE — OP NOTE
[facility]  Operative Note    SUMMARY     Date of Procedure: 4/15/2025     Procedure: Procedure(s) (LRB):  HYSTEROSCOPY, WITH DILATION AND CURETTAGE OF UTERUS (N/A)  INSERTION, INTRAUTERINE DEVICE (N/A)       Surgeon: Jessica Huddleston M.D.    Assisting Surgeon: None    Pre-Operative Diagnosis: Abnormal uterine bleeding (AUB) [N93.9]    Post-Operative Diagnosis: Post-Op Diagnosis Codes:     * Abnormal uterine bleeding (AUB) [N93.9]    Anesthesia: General    Technical Procedures Used: Hysteroscopy D&C, Mirena IUD insertion    Description of the Findings of the Procedure: Patient was taken to the operating room where general anesthesia was performed and found to be adequate. She was prepped and draped in the normal sterile fashion in the dorsal lithotomy position in jese stirrups. Bladder was drained with straight catheter. Weighted speculum placed in the posterior vagina and the anterior vagina was elevated with soco retractor. The anterior lip of the cervix was grasped with the single tooth tenaculum. The uterus was then sounded and measured 7 cm in length. The dilators were used to dilate the cervix enough to allow introduction of the hysteroscope. The hysteroscope was introduced and uterine cavity was visualized. The uterine cavity was noted to be free of polyps or other anomalies. Bilateral tubal ostia were visualized.  The hysteroscope was removed without difficulty.  Mirena IUD was brought into the operative field and prepared.  It was placed at the uterine fundus without difficulty.  Strings were trimmed.  All instruments were then removed from the vagina. Small vaginal laceration noted at 5 o clock and repaired with 2-0 vicryl suture.  Hemostasis present.  The patient tolerated procedure well. Sponge lap and needle counts were correct x 2.    Complications: No  Mirena LOT [id number]  Exp 5/01/27    Estimated Blood Loss (EBL): < 10 mL           Specimens:   Specimen (24h ago, onward)        Start     Ordered    04/15/25 1546  Specimen to Pathology Gynecology and Obstetrics  RELEASE UPON ORDERING        References:    Click here for ordering Quick Tip   Question:  Release to patient  Answer:  Immediate    04/15/25 1734                            Condition: Good    Disposition: PACU - hemodynamically stable.    Attestation: There was no qualified resident available for this procedure.

## 2025-04-17 ENCOUNTER — RESULTS FOLLOW-UP (OUTPATIENT)
Dept: OBSTETRICS AND GYNECOLOGY | Facility: CLINIC | Age: 35
End: 2025-04-17

## 2025-04-17 LAB
ESTROGEN SERPL-MCNC: NORMAL PG/ML
INSULIN SERPL-ACNC: NORMAL U[IU]/ML
LAB AP CLINICAL INFORMATION: NORMAL
LAB AP GROSS DESCRIPTION: NORMAL
LAB AP PERFORMING LOCATION(S): NORMAL
LAB AP REPORT FOOTNOTES: NORMAL

## 2025-04-18 ENCOUNTER — TELEPHONE (OUTPATIENT)
Dept: OBSTETRICS AND GYNECOLOGY | Facility: CLINIC | Age: 35
End: 2025-04-18
Payer: COMMERCIAL

## 2025-04-18 NOTE — TELEPHONE ENCOUNTER
Late entry:  Phone call made to patient to follow up after surgery.  She is doing well.  No issues or concerns.  Ambulating, voiding, tolerating diet, passing flatus.  Pain well controlled.  She will call with any issus or concerns.  All questions answered. Results revieed.

## 2025-04-29 ENCOUNTER — OFFICE VISIT (OUTPATIENT)
Dept: OBSTETRICS AND GYNECOLOGY | Facility: CLINIC | Age: 35
End: 2025-04-29
Attending: STUDENT IN AN ORGANIZED HEALTH CARE EDUCATION/TRAINING PROGRAM
Payer: COMMERCIAL

## 2025-04-29 DIAGNOSIS — Z09 POSTOP CHECK: Primary | ICD-10-CM

## 2025-04-29 PROCEDURE — 99024 POSTOP FOLLOW-UP VISIT: CPT | Mod: 95,,, | Performed by: STUDENT IN AN ORGANIZED HEALTH CARE EDUCATION/TRAINING PROGRAM

## 2025-04-29 RX ORDER — NORGESTIMATE AND ETHINYL ESTRADIOL 0.25-0.035
1 KIT ORAL DAILY
Qty: 28 TABLET | Refills: 11 | Status: SHIPPED | OUTPATIENT
Start: 2025-04-29 | End: 2026-04-29

## 2025-04-29 NOTE — PROGRESS NOTES
The patient location is: Home  The chief complaint leading to consultation is: post op     Visit type: audiovisual    Face to Face time with patient: 6 mins  20 minutes of total time spent on the encounter, which includes face to face time and non-face to face time preparing to see the patient (eg, review of tests), Obtaining and/or reviewing separately obtained history, Documenting clinical information in the electronic or other health record, Independently interpreting results (not separately reported) and communicating results to the patient/family/caregiver, or Care coordination (not separately reported).         Each patient to whom he or she provides medical services by telemedicine is:  (1) informed of the relationship between the physician and patient and the respective role of any other health care provider with respect to management of the patient; and (2) notified that he or she may decline to receive medical services by telemedicine and may withdraw from such care at any time.    Notes:     Subjective:       Roxanna Banuelos is a 34 y.o.  who presents  2 weeks status post  hysteroscopy D&C and iud insertion  for abnormal uterine bleeding. Eating a regular diet without difficulty. Bowel movements are normal. The patient is not having any pain.    The patient's allergies, and past medical and surgical histories were reviewed.    Review of Systems  Pertinent items are noted in HPI.      Objective:      LMP 2025     APPEARANCE: Well nourished, well developed, in no acute distress.  PSYCH: Appropriate mood and affect.  Deferred    Pathology:  Benign endometrial polyp, polypoid endocervix,        Assessment:      Doing well postoperatively.  Operative findings again reviewed. Pathology report discussed.      Plan:      1. Continue any current medications. Will go ahead with OCPs this next month and then transition over to iud.  Pt in agreement.  2. Wound care discussed.  3. Activity  restrictions: none  4. Anticipated return to work: now.  5. Follow up:  3 months or sooner if needed.

## 2025-08-06 ENCOUNTER — PATIENT MESSAGE (OUTPATIENT)
Dept: FAMILY MEDICINE | Facility: CLINIC | Age: 35
End: 2025-08-06
Payer: COMMERCIAL

## (undated) DEVICE — SET BASIN 48X48IN 6000ML RING

## (undated) DEVICE — SOL POVIDONE PREP IODINE 4 OZ

## (undated) DEVICE — SEAL LENS SCOPE MYOSURE

## (undated) DEVICE — GLOVE SENSICARE PI GRN 7

## (undated) DEVICE — PACK FLUENT DISPOSABLE

## (undated) DEVICE — SOL IRR SOD CHL .9% POUR

## (undated) DEVICE — Device

## (undated) DEVICE — SOL POVIDONE SCRUB IODINE 4 OZ

## (undated) DEVICE — SOL NACL IRR 3000ML

## (undated) DEVICE — GLOVE SENSICARE PI SURG 6.5